# Patient Record
Sex: FEMALE | Race: WHITE | NOT HISPANIC OR LATINO | Employment: OTHER | URBAN - METROPOLITAN AREA
[De-identification: names, ages, dates, MRNs, and addresses within clinical notes are randomized per-mention and may not be internally consistent; named-entity substitution may affect disease eponyms.]

---

## 2017-01-06 ENCOUNTER — CONVERSION ENCOUNTER (OUTPATIENT)
Dept: FAMILY MEDICINE CLINIC | Facility: CLINIC | Age: 72
End: 2017-01-06

## 2017-04-10 ENCOUNTER — CONVERSION ENCOUNTER (OUTPATIENT)
Dept: FAMILY MEDICINE CLINIC | Facility: CLINIC | Age: 72
End: 2017-04-10

## 2017-04-11 ENCOUNTER — ON CAMPUS - OUTPATIENT (AMBULATORY)
Dept: URBAN - METROPOLITAN AREA HOSPITAL 2 | Facility: HOSPITAL | Age: 72
End: 2017-04-11
Payer: MEDICARE

## 2017-04-11 VITALS
SYSTOLIC BLOOD PRESSURE: 125 MMHG | HEART RATE: 83 BPM | HEART RATE: 78 BPM | SYSTOLIC BLOOD PRESSURE: 148 MMHG | DIASTOLIC BLOOD PRESSURE: 97 MMHG | DIASTOLIC BLOOD PRESSURE: 55 MMHG | SYSTOLIC BLOOD PRESSURE: 131 MMHG | OXYGEN SATURATION: 96 % | SYSTOLIC BLOOD PRESSURE: 110 MMHG | HEIGHT: 63 IN | DIASTOLIC BLOOD PRESSURE: 61 MMHG | DIASTOLIC BLOOD PRESSURE: 63 MMHG | HEART RATE: 82 BPM | OXYGEN SATURATION: 95 % | DIASTOLIC BLOOD PRESSURE: 71 MMHG | HEART RATE: 74 BPM | SYSTOLIC BLOOD PRESSURE: 127 MMHG | SYSTOLIC BLOOD PRESSURE: 117 MMHG | SYSTOLIC BLOOD PRESSURE: 137 MMHG | OXYGEN SATURATION: 98 % | RESPIRATION RATE: 18 BRPM | OXYGEN SATURATION: 97 % | TEMPERATURE: 97.4 F | WEIGHT: 153 LBS | RESPIRATION RATE: 16 BRPM | DIASTOLIC BLOOD PRESSURE: 50 MMHG | DIASTOLIC BLOOD PRESSURE: 67 MMHG | HEART RATE: 88 BPM | HEART RATE: 77 BPM | SYSTOLIC BLOOD PRESSURE: 123 MMHG | HEART RATE: 70 BPM | OXYGEN SATURATION: 99 % | SYSTOLIC BLOOD PRESSURE: 118 MMHG

## 2017-04-11 DIAGNOSIS — Z80.0 FAMILY HISTORY OF MALIGNANT NEOPLASM OF DIGESTIVE ORGANS: ICD-10-CM

## 2017-04-11 PROBLEM — Z12.11 SCREENING FOR MALIGNANT NEOPLASMS OF COLON: Status: ACTIVE | Noted: 2017-04-11

## 2017-04-11 PROCEDURE — G0105 COLORECTAL SCRN; HI RISK IND: HCPCS | Performed by: INTERNAL MEDICINE

## 2017-04-11 RX ADMIN — PROPOFOL: 10 INJECTION, EMULSION INTRAVENOUS at 15:59

## 2017-04-11 RX ADMIN — Medication 30 CC: at 13:58

## 2017-04-17 ENCOUNTER — HOSPITAL ENCOUNTER (OUTPATIENT)
Dept: MAMMOGRAPHY | Facility: HOSPITAL | Age: 72
Discharge: HOME | End: 2017-04-17
Attending: FAMILY MEDICINE | Admitting: FAMILY MEDICINE

## 2017-04-18 ENCOUNTER — OFFICE (AMBULATORY)
Dept: URBAN - METROPOLITAN AREA CLINIC 64 | Facility: CLINIC | Age: 72
End: 2017-04-18
Payer: COMMERCIAL

## 2017-04-18 VITALS
HEART RATE: 83 BPM | WEIGHT: 154 LBS | HEIGHT: 63 IN | SYSTOLIC BLOOD PRESSURE: 130 MMHG | DIASTOLIC BLOOD PRESSURE: 73 MMHG

## 2017-04-18 DIAGNOSIS — R15.9 FULL INCONTINENCE OF FECES: ICD-10-CM

## 2017-04-18 DIAGNOSIS — R32 UNSPECIFIED URINARY INCONTINENCE: ICD-10-CM

## 2017-04-18 PROCEDURE — 95970 ALYS NPGT W/O PRGRMG: CPT | Performed by: NURSE PRACTITIONER

## 2017-04-18 PROCEDURE — 99213 OFFICE O/P EST LOW 20 MIN: CPT | Mod: 25 | Performed by: NURSE PRACTITIONER

## 2017-05-02 ENCOUNTER — OFFICE (AMBULATORY)
Dept: URBAN - METROPOLITAN AREA CLINIC 64 | Facility: CLINIC | Age: 72
End: 2017-05-02

## 2017-05-02 VITALS
DIASTOLIC BLOOD PRESSURE: 75 MMHG | HEART RATE: 86 BPM | HEIGHT: 63 IN | SYSTOLIC BLOOD PRESSURE: 134 MMHG | WEIGHT: 155 LBS

## 2017-05-02 DIAGNOSIS — R15.9 FULL INCONTINENCE OF FECES: ICD-10-CM

## 2017-05-02 PROCEDURE — 99213 OFFICE O/P EST LOW 20 MIN: CPT | Mod: 25 | Performed by: NURSE PRACTITIONER

## 2017-05-02 PROCEDURE — 95971 ALYS SMPL SP/PN NPGT W/PRGRM: CPT | Performed by: NURSE PRACTITIONER

## 2017-06-01 ENCOUNTER — OFFICE (AMBULATORY)
Dept: URBAN - METROPOLITAN AREA CLINIC 64 | Facility: CLINIC | Age: 72
End: 2017-06-01

## 2017-06-01 VITALS
WEIGHT: 153 LBS | HEART RATE: 91 BPM | HEIGHT: 63 IN | SYSTOLIC BLOOD PRESSURE: 114 MMHG | DIASTOLIC BLOOD PRESSURE: 64 MMHG

## 2017-06-01 DIAGNOSIS — R15.9 FULL INCONTINENCE OF FECES: ICD-10-CM

## 2017-06-01 DIAGNOSIS — R32 UNSPECIFIED URINARY INCONTINENCE: ICD-10-CM

## 2017-06-01 PROCEDURE — 95971 ALYS SMPL SP/PN NPGT W/PRGRM: CPT | Performed by: NURSE PRACTITIONER

## 2017-07-14 ENCOUNTER — OFFICE (AMBULATORY)
Dept: URBAN - METROPOLITAN AREA CLINIC 64 | Facility: CLINIC | Age: 72
End: 2017-07-14

## 2017-07-14 VITALS
SYSTOLIC BLOOD PRESSURE: 118 MMHG | HEIGHT: 63 IN | HEART RATE: 88 BPM | DIASTOLIC BLOOD PRESSURE: 68 MMHG | WEIGHT: 153 LBS

## 2017-07-14 DIAGNOSIS — R15.9 FULL INCONTINENCE OF FECES: ICD-10-CM

## 2017-07-14 PROCEDURE — 99212 OFFICE O/P EST SF 10 MIN: CPT | Performed by: NURSE PRACTITIONER

## 2017-08-28 ENCOUNTER — CONVERSION ENCOUNTER (OUTPATIENT)
Dept: FAMILY MEDICINE CLINIC | Facility: CLINIC | Age: 72
End: 2017-08-28

## 2017-08-28 ENCOUNTER — HOSPITAL ENCOUNTER (OUTPATIENT)
Dept: FAMILY MEDICINE CLINIC | Facility: CLINIC | Age: 72
Setting detail: SPECIMEN
Discharge: HOME | End: 2017-08-28
Attending: FAMILY MEDICINE | Admitting: FAMILY MEDICINE

## 2017-08-28 LAB
BASOPHILS # BLD AUTO: 0 10*3/UL (ref 0–0.2)
BASOPHILS NFR BLD AUTO: 1 % (ref 0–2)
BILIRUB UR QL STRIP: NEGATIVE MG/DL
CASTS URNS QL MICRO: ABNORMAL /[LPF]
COLOR UR: YELLOW
CONV BACTERIA IN URINE MICRO: NEGATIVE
CONV CLARITY OF URINE: CLEAR
CONV HYALINE CASTS IN URINE MICRO: 0 /[LPF] (ref 0–5)
CONV PROTEIN IN URINE BY AUTOMATED TEST STRIP: NEGATIVE MG/DL
CONV SMALL ROUND CELLS: ABNORMAL /[HPF]
CONV UROBILINOGEN IN URINE BY AUTOMATED TEST STRIP: 1 MG/DL
CULTURE INDICATED?: ABNORMAL
DIFFERENTIAL METHOD BLD: (no result)
EOSINOPHIL # BLD AUTO: 0.1 10*3/UL (ref 0–0.3)
EOSINOPHIL # BLD AUTO: 2 % (ref 0–3)
ERYTHROCYTE [DISTWIDTH] IN BLOOD BY AUTOMATED COUNT: 12.8 % (ref 11.5–14.5)
GLUCOSE UR QL: NEGATIVE MG/DL
HCT VFR BLD AUTO: 40.3 % (ref 35–49)
HGB BLD-MCNC: 13.6 G/DL (ref 12–15)
HGB UR QL STRIP: NEGATIVE
KETONES UR QL STRIP: NEGATIVE MG/DL
LEUKOCYTE ESTERASE UR QL STRIP: ABNORMAL
LYMPHOCYTES # BLD AUTO: 1.2 10*3/UL (ref 0.8–4.8)
LYMPHOCYTES NFR BLD AUTO: 21 % (ref 18–42)
MCH RBC QN AUTO: 31.7 PG (ref 26–32)
MCHC RBC AUTO-ENTMCNC: 33.8 G/DL (ref 32–36)
MCV RBC AUTO: 93.8 FL (ref 80–94)
MONOCYTES # BLD AUTO: 0.4 10*3/UL (ref 0.1–1.3)
MONOCYTES NFR BLD AUTO: 7 % (ref 2–11)
NEUTROPHILS # BLD AUTO: 4 10*3/UL (ref 2.3–8.6)
NEUTROPHILS NFR BLD AUTO: 69 % (ref 50–75)
NITRITE UR QL STRIP: NEGATIVE
NRBC BLD AUTO-RTO: 0 /100{WBCS}
NRBC/RBC NFR BLD MANUAL: 0 10*3/UL
PH UR STRIP.AUTO: 6.5 [PH] (ref 4.5–8)
PLATELET # BLD AUTO: 198 10*3/UL (ref 150–450)
PMV BLD AUTO: 8.8 FL (ref 7.4–10.4)
RBC # BLD AUTO: 4.29 10*6/UL (ref 4–5.4)
RBC #/AREA URNS HPF: 3 /[HPF] (ref 0–3)
SP GR UR: 1.02 (ref 1–1.03)
SPERM URNS QL MICRO: ABNORMAL /[HPF]
SQUAMOUS SPT QL MICRO: 3 /[HPF] (ref 0–5)
UNIDENT CRYS URNS QL MICRO: ABNORMAL /[HPF]
WBC # BLD AUTO: 5.7 10*3/UL (ref 4.5–11.5)
WBC #/AREA URNS HPF: 9 /[HPF] (ref 0–5)
YEAST SPEC QL WET PREP: ABNORMAL /[HPF]

## 2017-08-29 LAB
AMPICILLIN SUSC ISLT: ABNORMAL
BACTERIA ISLT: ABNORMAL
BACTERIA SPEC AEROBE CULT: ABNORMAL
COLONY COUNT: ABNORMAL
LEVOFLOXACIN SUSC ISLT: ABNORMAL
Lab: ABNORMAL
MICRO REPORT STATUS: ABNORMAL
NITROFURANTOIN SUSC ISLT: ABNORMAL
SPECIMEN SOURCE: ABNORMAL
SUSC METH SPEC: ABNORMAL
TETRACYCLINE SUSC ISLT: ABNORMAL
VANCOMYCIN SUSC ISLT: ABNORMAL

## 2017-09-11 ENCOUNTER — HOSPITAL ENCOUNTER (OUTPATIENT)
Dept: CT IMAGING | Facility: HOSPITAL | Age: 72
Discharge: HOME | End: 2017-09-11
Attending: FAMILY MEDICINE | Admitting: FAMILY MEDICINE

## 2018-04-27 ENCOUNTER — HOSPITAL ENCOUNTER (OUTPATIENT)
Dept: FAMILY MEDICINE CLINIC | Facility: CLINIC | Age: 73
Setting detail: SPECIMEN
Discharge: HOME | End: 2018-04-27
Attending: FAMILY MEDICINE | Admitting: FAMILY MEDICINE

## 2018-04-27 LAB
ALBUMIN SERPL-MCNC: 3.9 G/DL (ref 3.5–4.8)
ALBUMIN/GLOB SERPL: 1.2 {RATIO} (ref 1–1.7)
ALP SERPL-CCNC: 70 IU/L (ref 32–91)
ALT SERPL-CCNC: 15 IU/L (ref 14–54)
ANION GAP SERPL CALC-SCNC: 12.1 MMOL/L (ref 10–20)
AST SERPL-CCNC: 22 IU/L (ref 15–41)
BASOPHILS # BLD AUTO: 0 10*3/UL (ref 0–0.2)
BASOPHILS NFR BLD AUTO: 1 % (ref 0–2)
BILIRUB SERPL-MCNC: 0.7 MG/DL (ref 0.3–1.2)
BILIRUB UR QL STRIP: NEGATIVE MG/DL
BUN SERPL-MCNC: 18 MG/DL (ref 8–20)
BUN/CREAT SERPL: 20 (ref 5.4–26.2)
CALCIUM SERPL-MCNC: 9.5 MG/DL (ref 8.9–10.3)
CASTS URNS QL MICRO: ABNORMAL /[LPF]
CHLORIDE SERPL-SCNC: 104 MMOL/L (ref 101–111)
CHOLEST SERPL-MCNC: 218 MG/DL
CHOLEST/HDLC SERPL: 2.3 {RATIO}
COLOR UR: YELLOW
CONV BACTERIA IN URINE MICRO: NEGATIVE
CONV CLARITY OF URINE: CLEAR
CONV CO2: 27 MMOL/L (ref 22–32)
CONV HYALINE CASTS IN URINE MICRO: 2 /[LPF] (ref 0–5)
CONV LDL CHOLESTEROL DIRECT: 96 MG/DL (ref 0–100)
CONV PROTEIN IN URINE BY AUTOMATED TEST STRIP: NEGATIVE MG/DL
CONV SMALL ROUND CELLS: ABNORMAL /[HPF]
CONV TOTAL PROTEIN: 7.1 G/DL (ref 6.1–7.9)
CONV UROBILINOGEN IN URINE BY AUTOMATED TEST STRIP: 1 MG/DL
CREAT UR-MCNC: 0.9 MG/DL (ref 0.4–1)
CULTURE INDICATED?: ABNORMAL
DIFFERENTIAL METHOD BLD: (no result)
EOSINOPHIL # BLD AUTO: 0.1 10*3/UL (ref 0–0.3)
EOSINOPHIL # BLD AUTO: 2 % (ref 0–3)
ERYTHROCYTE [DISTWIDTH] IN BLOOD BY AUTOMATED COUNT: 12.9 % (ref 11.5–14.5)
GLOBULIN UR ELPH-MCNC: 3.2 G/DL (ref 2.5–3.8)
GLUCOSE SERPL-MCNC: 90 MG/DL (ref 65–99)
GLUCOSE UR QL: NEGATIVE MG/DL
HCT VFR BLD AUTO: 41.9 % (ref 35–49)
HDLC SERPL-MCNC: 95 MG/DL
HGB BLD-MCNC: 14.1 G/DL (ref 12–15)
HGB UR QL STRIP: NEGATIVE
KETONES UR QL STRIP: NEGATIVE MG/DL
LDLC/HDLC SERPL: 1 {RATIO}
LEUKOCYTE ESTERASE UR QL STRIP: ABNORMAL
LIPID INTERPRETATION: ABNORMAL
LYMPHOCYTES # BLD AUTO: 1.5 10*3/UL (ref 0.8–4.8)
LYMPHOCYTES NFR BLD AUTO: 29 % (ref 18–42)
MCH RBC QN AUTO: 31 PG (ref 26–32)
MCHC RBC AUTO-ENTMCNC: 33.7 G/DL (ref 32–36)
MCV RBC AUTO: 92.1 FL (ref 80–94)
MONOCYTES # BLD AUTO: 0.3 10*3/UL (ref 0.1–1.3)
MONOCYTES NFR BLD AUTO: 7 % (ref 2–11)
NEUTROPHILS # BLD AUTO: 3.3 10*3/UL (ref 2.3–8.6)
NEUTROPHILS NFR BLD AUTO: 61 % (ref 50–75)
NITRITE UR QL STRIP: NEGATIVE
NRBC BLD AUTO-RTO: 0 /100{WBCS}
NRBC/RBC NFR BLD MANUAL: 0 10*3/UL
PH UR STRIP.AUTO: 7.5 [PH] (ref 4.5–8)
PLATELET # BLD AUTO: 194 10*3/UL (ref 150–450)
PMV BLD AUTO: 8.6 FL (ref 7.4–10.4)
POTASSIUM SERPL-SCNC: 4.1 MMOL/L (ref 3.6–5.1)
RBC # BLD AUTO: 4.54 10*6/UL (ref 4–5.4)
RBC #/AREA URNS HPF: 4 /[HPF] (ref 0–3)
SODIUM SERPL-SCNC: 139 MMOL/L (ref 136–144)
SP GR UR: 1.02 (ref 1–1.03)
SPERM URNS QL MICRO: ABNORMAL /[HPF]
SQUAMOUS SPT QL MICRO: 1 /[HPF] (ref 0–5)
TRIGL SERPL-MCNC: 121 MG/DL
UNIDENT CRYS URNS QL MICRO: ABNORMAL /[HPF]
VLDLC SERPL CALC-MCNC: 26.9 MG/DL
WBC # BLD AUTO: 5.2 10*3/UL (ref 4.5–11.5)
WBC #/AREA URNS HPF: 2 /[HPF] (ref 0–5)
YEAST SPEC QL WET PREP: ABNORMAL /[HPF]

## 2018-05-15 ENCOUNTER — CONVERSION ENCOUNTER (OUTPATIENT)
Dept: FAMILY MEDICINE CLINIC | Facility: CLINIC | Age: 73
End: 2018-05-15

## 2018-05-21 ENCOUNTER — HOSPITAL ENCOUNTER (OUTPATIENT)
Dept: MAMMOGRAPHY | Facility: HOSPITAL | Age: 73
Discharge: HOME | End: 2018-05-21
Attending: FAMILY MEDICINE | Admitting: FAMILY MEDICINE

## 2018-05-22 ENCOUNTER — CONVERSION ENCOUNTER (OUTPATIENT)
Dept: FAMILY MEDICINE CLINIC | Facility: CLINIC | Age: 73
End: 2018-05-22

## 2019-06-04 VITALS
RESPIRATION RATE: 12 BRPM | BODY MASS INDEX: 26.93 KG/M2 | WEIGHT: 148 LBS | SYSTOLIC BLOOD PRESSURE: 122 MMHG | HEIGHT: 63 IN | RESPIRATION RATE: 16 BRPM | BODY MASS INDEX: 26.22 KG/M2 | HEART RATE: 91 BPM | WEIGHT: 150 LBS | SYSTOLIC BLOOD PRESSURE: 138 MMHG | RESPIRATION RATE: 20 BRPM | DIASTOLIC BLOOD PRESSURE: 70 MMHG | SYSTOLIC BLOOD PRESSURE: 162 MMHG | HEIGHT: 63 IN | RESPIRATION RATE: 16 BRPM | DIASTOLIC BLOOD PRESSURE: 70 MMHG | HEART RATE: 96 BPM | WEIGHT: 152 LBS | OXYGEN SATURATION: 97 % | SYSTOLIC BLOOD PRESSURE: 144 MMHG | HEART RATE: 82 BPM | HEIGHT: 63 IN | BODY MASS INDEX: 26.58 KG/M2 | HEART RATE: 94 BPM | DIASTOLIC BLOOD PRESSURE: 72 MMHG | BODY MASS INDEX: 27.29 KG/M2 | DIASTOLIC BLOOD PRESSURE: 75 MMHG | OXYGEN SATURATION: 99 % | WEIGHT: 154 LBS | SYSTOLIC BLOOD PRESSURE: 124 MMHG | RESPIRATION RATE: 16 BRPM | WEIGHT: 165 LBS | DIASTOLIC BLOOD PRESSURE: 78 MMHG | HEART RATE: 83 BPM | OXYGEN SATURATION: 97 %

## 2019-08-12 ENCOUNTER — TELEPHONE (OUTPATIENT)
Dept: FAMILY MEDICINE CLINIC | Facility: CLINIC | Age: 74
End: 2019-08-12

## 2019-08-12 NOTE — TELEPHONE ENCOUNTER
Patient called and scheduled physical on 9/17 and fasting labs on 9/10. Please enter lab order. Thanks!

## 2019-08-13 DIAGNOSIS — I10 BENIGN ESSENTIAL HYPERTENSION: ICD-10-CM

## 2019-08-13 DIAGNOSIS — E78.2 MIXED HYPERLIPIDEMIA: Primary | ICD-10-CM

## 2019-08-13 DIAGNOSIS — E53.8 B12 DEFICIENCY: ICD-10-CM

## 2019-09-10 ENCOUNTER — LAB (OUTPATIENT)
Dept: FAMILY MEDICINE CLINIC | Facility: CLINIC | Age: 74
End: 2019-09-10

## 2019-09-10 DIAGNOSIS — E78.2 MIXED HYPERLIPIDEMIA: ICD-10-CM

## 2019-09-10 DIAGNOSIS — I10 BENIGN ESSENTIAL HYPERTENSION: ICD-10-CM

## 2019-09-10 DIAGNOSIS — E53.8 B12 DEFICIENCY: ICD-10-CM

## 2019-09-10 LAB
ALBUMIN SERPL-MCNC: 3.9 G/DL (ref 3.5–4.8)
ALBUMIN/GLOB SERPL: 1.4 G/DL (ref 1–1.7)
ALP SERPL-CCNC: 67 U/L (ref 32–91)
ALT SERPL W P-5'-P-CCNC: 17 U/L (ref 14–54)
ANION GAP SERPL CALCULATED.3IONS-SCNC: 13.9 MMOL/L (ref 5–15)
ARTICHOKE IGE QN: 116 MG/DL (ref 0–100)
AST SERPL-CCNC: 20 U/L (ref 15–41)
BASOPHILS # BLD AUTO: 0 10*3/MM3 (ref 0–0.2)
BASOPHILS NFR BLD AUTO: 0.7 % (ref 0–1.5)
BILIRUB SERPL-MCNC: 0.9 MG/DL (ref 0.3–1.2)
BILIRUB UR QL STRIP: NEGATIVE
BUN BLD-MCNC: 21 MG/DL (ref 8–20)
BUN/CREAT SERPL: 19.1 (ref 5.4–26.2)
CALCIUM SPEC-SCNC: 9.6 MG/DL (ref 8.9–10.3)
CHLORIDE SERPL-SCNC: 105 MMOL/L (ref 101–111)
CHOLEST SERPL-MCNC: 238 MG/DL
CLARITY UR: CLEAR
CO2 SERPL-SCNC: 24 MMOL/L (ref 22–32)
COLOR UR: YELLOW
CREAT BLD-MCNC: 1.1 MG/DL (ref 0.4–1)
DEPRECATED RDW RBC AUTO: 42 FL (ref 37–54)
EOSINOPHIL # BLD AUTO: 0.1 10*3/MM3 (ref 0–0.4)
EOSINOPHIL NFR BLD AUTO: 1.7 % (ref 0.3–6.2)
ERYTHROCYTE [DISTWIDTH] IN BLOOD BY AUTOMATED COUNT: 13.2 % (ref 12.3–15.4)
GFR SERPL CREATININE-BSD FRML MDRD: 49 ML/MIN/1.73
GLOBULIN UR ELPH-MCNC: 2.8 GM/DL (ref 2.5–3.8)
GLUCOSE BLD-MCNC: 85 MG/DL (ref 65–99)
GLUCOSE UR STRIP-MCNC: NEGATIVE MG/DL
HCT VFR BLD AUTO: 38.7 % (ref 34–46.6)
HDLC SERPL QL: 2.33
HDLC SERPL-MCNC: 102 MG/DL
HGB BLD-MCNC: 13.4 G/DL (ref 12–15.9)
HGB UR QL STRIP.AUTO: NEGATIVE
KETONES UR QL STRIP: NEGATIVE
LDLC/HDLC SERPL: 1.15 {RATIO}
LEUKOCYTE ESTERASE UR QL STRIP.AUTO: NEGATIVE
LYMPHOCYTES # BLD AUTO: 1.4 10*3/MM3 (ref 0.7–3.1)
LYMPHOCYTES NFR BLD AUTO: 28.2 % (ref 19.6–45.3)
MCH RBC QN AUTO: 32 PG (ref 26.6–33)
MCHC RBC AUTO-ENTMCNC: 34.7 G/DL (ref 31.5–35.7)
MCV RBC AUTO: 92.1 FL (ref 79–97)
MONOCYTES # BLD AUTO: 0.3 10*3/MM3 (ref 0.1–0.9)
MONOCYTES NFR BLD AUTO: 6.4 % (ref 5–12)
NEUTROPHILS # BLD AUTO: 3.2 10*3/MM3 (ref 1.7–7)
NEUTROPHILS NFR BLD AUTO: 63 % (ref 42.7–76)
NITRITE UR QL STRIP: NEGATIVE
NRBC BLD AUTO-RTO: 0.2 /100 WBC (ref 0–0.2)
PH UR STRIP.AUTO: 7 [PH] (ref 5–8)
PLATELET # BLD AUTO: 215 10*3/MM3 (ref 140–450)
PMV BLD AUTO: 8.3 FL (ref 6–12)
POTASSIUM BLD-SCNC: 3.9 MMOL/L (ref 3.6–5.1)
PROT SERPL-MCNC: 6.7 G/DL (ref 6.1–7.9)
PROT UR QL STRIP: NEGATIVE
RBC # BLD AUTO: 4.2 10*6/MM3 (ref 3.77–5.28)
SODIUM BLD-SCNC: 139 MMOL/L (ref 136–144)
SP GR UR STRIP: 1.02 (ref 1–1.03)
TRIGL SERPL-MCNC: 96 MG/DL
TSH SERPL DL<=0.05 MIU/L-ACNC: 1.95 UIU/ML (ref 0.34–5.6)
UROBILINOGEN UR QL STRIP: NORMAL
VIT B12 BLD-MCNC: 247 PG/ML (ref 180–914)
VLDLC SERPL-MCNC: 19.2 MG/DL
WBC NRBC COR # BLD: 5 10*3/MM3 (ref 3.4–10.8)

## 2019-09-10 PROCEDURE — 81003 URINALYSIS AUTO W/O SCOPE: CPT | Performed by: FAMILY MEDICINE

## 2019-09-10 PROCEDURE — 82607 VITAMIN B-12: CPT | Performed by: FAMILY MEDICINE

## 2019-09-10 PROCEDURE — 80061 LIPID PANEL: CPT | Performed by: FAMILY MEDICINE

## 2019-09-10 PROCEDURE — 85025 COMPLETE CBC W/AUTO DIFF WBC: CPT | Performed by: FAMILY MEDICINE

## 2019-09-10 PROCEDURE — 84443 ASSAY THYROID STIM HORMONE: CPT | Performed by: FAMILY MEDICINE

## 2019-09-10 PROCEDURE — 80053 COMPREHEN METABOLIC PANEL: CPT | Performed by: FAMILY MEDICINE

## 2019-09-17 ENCOUNTER — OFFICE VISIT (OUTPATIENT)
Dept: FAMILY MEDICINE CLINIC | Facility: CLINIC | Age: 74
End: 2019-09-17

## 2019-09-17 VITALS
BODY MASS INDEX: 25.33 KG/M2 | RESPIRATION RATE: 12 BRPM | SYSTOLIC BLOOD PRESSURE: 100 MMHG | WEIGHT: 143 LBS | DIASTOLIC BLOOD PRESSURE: 61 MMHG | HEART RATE: 94 BPM

## 2019-09-17 DIAGNOSIS — M85.852 OSTEOPENIA OF NECKS OF BOTH FEMURS: ICD-10-CM

## 2019-09-17 DIAGNOSIS — Z00.00 MEDICARE ANNUAL WELLNESS VISIT, SUBSEQUENT: Primary | ICD-10-CM

## 2019-09-17 DIAGNOSIS — R00.2 PALPITATIONS: ICD-10-CM

## 2019-09-17 DIAGNOSIS — R00.2 HEART PALPITATIONS: ICD-10-CM

## 2019-09-17 DIAGNOSIS — M85.851 OSTEOPENIA OF NECKS OF BOTH FEMURS: ICD-10-CM

## 2019-09-17 DIAGNOSIS — Z12.39 BREAST CANCER SCREENING: ICD-10-CM

## 2019-09-17 PROBLEM — M13.0 POLYARTHROPATHY OR POLYARTHRITIS: Status: ACTIVE | Noted: 2019-09-17

## 2019-09-17 PROBLEM — K21.9 ESOPHAGEAL REFLUX: Status: ACTIVE | Noted: 2019-09-17

## 2019-09-17 PROBLEM — E53.8 B12 DEFICIENCY: Status: ACTIVE | Noted: 2017-04-10

## 2019-09-17 PROCEDURE — G0439 PPPS, SUBSEQ VISIT: HCPCS | Performed by: FAMILY MEDICINE

## 2019-09-17 RX ORDER — TRAZODONE HYDROCHLORIDE 50 MG/1
50 TABLET ORAL NIGHTLY
Qty: 90 TABLET | Refills: 3 | Status: SHIPPED | OUTPATIENT
Start: 2019-09-17 | End: 2020-08-31

## 2019-09-17 RX ORDER — ESCITALOPRAM OXALATE 10 MG/1
TABLET ORAL EVERY 24 HOURS
COMMUNITY
Start: 2018-12-07 | End: 2020-02-11

## 2019-09-17 RX ORDER — MELOXICAM 15 MG/1
TABLET ORAL
COMMUNITY
Start: 2018-05-15 | End: 2020-02-11

## 2019-09-17 RX ORDER — OMEPRAZOLE 40 MG/1
1 CAPSULE, DELAYED RELEASE ORAL EVERY 24 HOURS
COMMUNITY
Start: 2018-11-08 | End: 2020-02-11

## 2019-09-17 RX ORDER — HYDROCHLOROTHIAZIDE 12.5 MG/1
1 CAPSULE, GELATIN COATED ORAL EVERY 24 HOURS
COMMUNITY
Start: 2017-10-16 | End: 2020-02-11

## 2019-09-17 RX ORDER — DULOXETIN HYDROCHLORIDE 30 MG/1
CAPSULE, DELAYED RELEASE ORAL DAILY
COMMUNITY
Start: 2011-09-27 | End: 2020-02-11

## 2019-09-17 RX ORDER — TIZANIDINE 4 MG/1
4 TABLET ORAL NIGHTLY
Qty: 90 TABLET | Refills: 3 | Status: SHIPPED | OUTPATIENT
Start: 2019-09-17 | End: 2020-08-31

## 2019-09-17 RX ORDER — TRAZODONE HYDROCHLORIDE 50 MG/1
TABLET ORAL EVERY 24 HOURS
COMMUNITY
Start: 2018-05-11 | End: 2019-09-17 | Stop reason: SDUPTHER

## 2019-09-17 RX ORDER — TIZANIDINE 4 MG/1
TABLET ORAL EVERY 24 HOURS
COMMUNITY
Start: 2012-10-18 | End: 2019-09-17

## 2019-09-17 RX ORDER — ARIPIPRAZOLE 2 MG/1
TABLET ORAL EVERY 24 HOURS
COMMUNITY
Start: 2018-03-01 | End: 2019-09-17 | Stop reason: SDUPTHER

## 2019-09-17 RX ORDER — ARIPIPRAZOLE 2 MG/1
2 TABLET ORAL DAILY
Qty: 90 TABLET | Refills: 3 | Status: SHIPPED | OUTPATIENT
Start: 2019-09-17 | End: 2020-08-31

## 2019-09-17 NOTE — PATIENT INSTRUCTIONS
Exercising to Stay Healthy  To become healthy and stay healthy, it is recommended that you do moderate-intensity and vigorous-intensity exercise. You can tell that you are exercising at a moderate intensity if your heart starts beating faster and you start breathing faster but can still hold a conversation. You can tell that you are exercising at a vigorous intensity if you are breathing much harder and faster and cannot hold a conversation while exercising.  Exercising regularly is important. It has many health benefits, such as:  · Improving overall fitness, flexibility, and endurance.  · Increasing bone density.  · Helping with weight control.  · Decreasing body fat.  · Increasing muscle strength.  · Reducing stress and tension.  · Improving overall health.  How often should I exercise?  Choose an activity that you enjoy, and set realistic goals. Your health care provider can help you make an activity plan that works for you.  Exercise regularly as told by your health care provider. This may include:  · Doing strength training two times a week, such as:  ? Lifting weights.  ? Using resistance bands.  ? Push-ups.  ? Sit-ups.  ? Yoga.  · Doing a certain intensity of exercise for a given amount of time. Choose from these options:  ? A total of 150 minutes of moderate-intensity exercise every week.  ? A total of 75 minutes of vigorous-intensity exercise every week.  ? A mix of moderate-intensity and vigorous-intensity exercise every week.  Children, pregnant women, people who have not exercised regularly, people who are overweight, and older adults may need to talk with a health care provider about what activities are safe to do. If you have a medical condition, be sure to talk with your health care provider before you start a new exercise program.  What are some exercise ideas?  Moderate-intensity exercise ideas include:  · Walking 1 mile (1.6 km) in about 15  minutes.  · Biking.  · Hiking.  · Golfing.  · Dancing.  · Water aerobics.  Vigorous-intensity exercise ideas include:  · Walking 4.5 miles (7.2 km) or more in about 1 hour.  · Jogging or running 5 miles (8 km) in about 1 hour.  · Biking 10 miles (16.1 km) or more in about 1 hour.  · Lap swimming.  · Roller-skating or in-line skating.  · Cross-country skiing.  · Vigorous competitive sports, such as football, basketball, and soccer.  · Jumping rope.  · Aerobic dancing.  What are some everyday activities that can help me to get exercise?  · Yard work, such as:  ? Pushing a .  ? Raking and bagging leaves.  · Washing your car.  · Pushing a stroller.  · Shoveling snow.  · Gardening.  · Washing windows or floors.  How can I be more active in my day-to-day activities?  · Use stairs instead of an elevator.  · Take a walk during your lunch break.  · If you drive, park your car farther away from your work or school.  · If you take public transportation, get off one stop early and walk the rest of the way.  · Stand up or walk around during all of your indoor phone calls.  · Get up, stretch, and walk around every 30 minutes throughout the day.  · Enjoy exercise with a friend. Support to continue exercising will help you keep a regular routine of activity.  What guidelines can I follow while exercising?  · Before you start a new exercise program, talk with your health care provider.  · Do not exercise so much that you hurt yourself, feel dizzy, or get very short of breath.  · Wear comfortable clothes and wear shoes with good support.  · Drink plenty of water while you exercise to prevent dehydration or heat stroke.  · Work out until your breathing and your heartbeat get faster.  Where to find more information  · [place] Department of Health and Human Services: www.hhs.gov  · Centers for Disease Control and Prevention (CDC): www.cdc.gov  Summary  · Exercising regularly is important. It will improve your overall fitness,  flexibility, and endurance.  · Regular exercise also will improve your overall health. It can help you control your weight, reduce stress, and improve your bone density.  · Do not exercise so much that you hurt yourself, feel dizzy, or get very short of breath.  · Before you start a new exercise program, talk with your health care provider.  This information is not intended to replace advice given to you by your health care provider. Make sure you discuss any questions you have with your health care provider.  Document Released: 01/20/2012 Document Revised: 11/08/2018 Document Reviewed: 11/08/2018  [employer] Interactive Patient Education © 2019 [employer]    Living With Anxiety    After being diagnosed with an anxiety disorder, you may be relieved to know why you have felt or behaved a certain way. It is natural to also feel overwhelmed about the treatment ahead and what it will mean for your life. With care and support, you can manage this condition and recover from it.  How to cope with anxiety  Dealing with stress  Stress is your body’s reaction to life changes and events, both good and bad. Stress can last just a few hours or it can be ongoing. Stress can play a major role in anxiety, so it is important to learn both how to cope with stress and how to think about it differently.  Talk with your health care provider or a counselor to learn more about stress reduction. He or she may suggest some stress reduction techniques, such as:  · Music therapy. This can include creating or listening to music that you enjoy and that inspires you.  · Mindfulness-based meditation. This involves being aware of your normal breaths, rather than trying to control your breathing. It can be done while sitting or walking.  · Centering prayer. This is a kind of meditation that involves focusing on a word, phrase, or sacred image that is meaningful to you and that brings you peace.  · Deep breathing. To do this, expand your stomach  and inhale slowly through your nose. Hold your breath for 3-5 seconds. Then exhale slowly, allowing your stomach muscles to relax.  · Self-talk. This is a skill where you identify thought patterns that lead to anxiety reactions and correct those thoughts.  · Muscle relaxation. This involves tensing muscles then relaxing them.  Choose a stress reduction technique that fits your lifestyle and personality. Stress reduction techniques take time and practice. Set aside 5-15 minutes a day to do them. Therapists can offer training in these techniques. The training may be covered by some insurance plans. Other things you can do to manage stress include:  · Keeping a stress diary. This can help you learn what triggers your stress and ways to control your response.  · Thinking about how you respond to certain situations. You may not be able to control everything, but you can control your reaction.  · Making time for activities that help you relax, and not feeling guilty about spending your time in this way.  Therapy combined with coping and stress-reduction skills provides the best chance for successful treatment.  Medicines  Medicines can help ease symptoms. Medicines for anxiety include:  · Anti-anxiety drugs.  · Antidepressants.  · Beta-blockers.  Medicines may be used as the main treatment for anxiety disorder, along with therapy, or if other treatments are not working. Medicines should be prescribed by a health care provider.  Relationships  Relationships can play a big part in helping you recover. Try to spend more time connecting with trusted friends and family members. Consider going to couples counseling, taking family education classes, or going to family therapy. Therapy can help you and others better understand the condition.  How to recognize changes in your condition  Everyone has a different response to treatment for anxiety. Recovery from anxiety happens when symptoms decrease and stop interfering with your  daily activities at home or work. This may mean that you will start to:  · Have better concentration and focus.  · Sleep better.  · Be less irritable.  · Have more energy.  · Have improved memory.  It is important to recognize when your condition is getting worse. Contact your health care provider if your symptoms interfere with home or work and you do not feel like your condition is improving.  Where to find help and support:  You can get help and support from these sources:  · Self-help groups.  · Online and community organizations.  · A trusted spiritual leader.  · Couples counseling.  · Family education classes.  · Family therapy.  Follow these instructions at home:  · Eat a healthy diet that includes plenty of vegetables, fruits, whole grains, low-fat dairy products, and lean protein. Do not eat a lot of foods that are high in solid fats, added sugars, or salt.  · Exercise. Most adults should do the following:  ? Exercise for at least 150 minutes each week. The exercise should increase your heart rate and make you sweat (moderate-intensity exercise).  ? Strengthening exercises at least twice a week.  · Cut down on caffeine, tobacco, alcohol, and other potentially harmful substances.  · Get the right amount and quality of sleep. Most adults need 7-9 hours of sleep each night.  · Make choices that simplify your life.  · Take over-the-counter and prescription medicines only as told by your health care provider.  · Avoid caffeine, alcohol, and certain over-the-counter cold medicines. These may make you feel worse. Ask your pharmacist which medicines to avoid.  · Keep all follow-up visits as told by your health care provider. This is important.  Questions to ask your health care provider  · Would I benefit from therapy?  · How often should I follow up with a health care provider?  · How long do I need to take medicine?  · Are there any long-term side effects of my medicine?  · Are there any alternatives to taking  medicine?  Contact a health care provider if:  · You have a hard time staying focused or finishing daily tasks.  · You spend many hours a day feeling worried about everyday life.  · You become exhausted by worry.  · You start to have headaches, feel tense, or have nausea.  · You urinate more than normal.  · You have diarrhea.  Get help right away if:  · You have a racing heart and shortness of breath.  · You have thoughts of hurting yourself or others.  If you ever feel like you may hurt yourself or others, or have thoughts about taking your own life, get help right away. You can go to your nearest emergency department or call:  · Your local emergency services (911 in the [place]  · A suicide crisis helpline, such as the National Suicide Prevention Lifeline at 1-446.346.6965. This is open 24-hours a day.  Summary  · Taking steps to deal with stress can help calm you.  · Medicines cannot cure anxiety disorders, but they can help ease symptoms.  · Family, friends, and partners can play a big part in helping you recover from an anxiety disorder.  This information is not intended to replace advice given to you by your health care provider. Make sure you discuss any questions you have with your health care provider.  Document Released: 12/12/2017 Document Revised: 12/12/2017 Document Reviewed: 12/12/2017  [employer] Interactive Patient Education © 2019 [employer]

## 2019-09-17 NOTE — PROGRESS NOTES
The ABCs of the Annual Wellness Visit  Subsequent Medicare Wellness Visit    Chief Complaint   Patient presents with   • Medicare Wellness-subsequent       Subjective   History of Present Illness:  Chel Hi is a 73 y.o. female who presents for a Subsequent Medicare Wellness Visit.  Patient does not exercise.  She denies any chest pain or syncope.  She does complain of getting dizzy and lightheaded this been going on for 3 months.  She feels like her heart will skip during the same time that she feels lightheaded.  It is not exertional.  It happens daily at least the dizziness well and will last for several seconds.  It will sometimes get worse if she goes from a sitting to a standing position.  She has had no syncopal episodes.  She denies any chest pain.  She has no associated nausea vomiting or diaphoresis.  She did have a cardiac catheterization in 2014 that was normal.  Patient has a good memory and feels like her depression and anxiety and fibromyalgia are all under relatively good control.  She transports herself and takes care of her own own ADLs.    HEALTH RISK ASSESSMENT    Recent Hospitalizations:  No hospitalization(s) within the last year.    Current Medical Providers:  Patient Care Team:  John Santana MD as PCP - General  Ulysses, NI Laguerre as PCP - Claims Attributed    Smoking Status:  Social History     Tobacco Use   Smoking Status Never Smoker   Smokeless Tobacco Never Used       Alcohol Consumption:  Social History     Substance and Sexual Activity   Alcohol Use No   • Frequency: Never       Depression Screen:   PHQ-2/PHQ-9 Depression Screening 9/17/2019   Little interest or pleasure in doing things 3   Feeling down, depressed, or hopeless 3   Trouble falling or staying asleep, or sleeping too much 3   Feeling tired or having little energy 3   Poor appetite or overeating 0   Feeling bad about yourself - or that you are a failure or have let yourself or your family  down 3   Trouble concentrating on things, such as reading the newspaper or watching television 3   Moving or speaking so slowly that other people could have noticed. Or the opposite - being so fidgety or restless that you have been moving around a lot more than usual 0   Thoughts that you would be better off dead, or of hurting yourself in some way 0   Total Score 18   If you checked off any problems, how difficult have these problems made it for you to do your work, take care of things at home, or get along with other people? Not difficult at all       Fall Risk Screen:  STEBEKAH Fall Risk Assessment was completed, and patient is at LOW risk for falls.Assessment completed on:9/17/2019    Health Habits and Functional and Cognitive Screening:  Functional & Cognitive Status 9/17/2019   Do you have difficulty preparing food and eating? No   Do you have difficulty bathing yourself, getting dressed or grooming yourself? No   Do you have difficulty using the toilet? No   Do you have difficulty moving around from place to place? No   Do you have trouble with steps or getting out of a bed or a chair? No   Current Diet Well Balanced Diet   Dental Exam Up to date   Eye Exam Up to date   Exercise (times per week) 2 times per week   Current Exercise Activities Include Walking   Do you need help using the phone?  No   Are you deaf or do you have serious difficulty hearing?  Yes   Do you need help with transportation? No   Do you need help shopping? No   Do you need help preparing meals?  No   Do you need help with housework?  No   Do you need help with laundry? No   Do you need help taking your medications? No   Do you need help managing money? No   Have you felt unusual stress, anger or loneliness in the last month? Yes   Who do you live with? Spouse   If you need help, do you have trouble finding someone available to you? No   Do you have difficulty concentrating, remembering or making decisions? Yes         Does the patient  have evidence of cognitive impairment? No    Asprin use counseling:Does not need ASA (and currently is not on it)    Age-appropriate Screening Schedule:  Refer to the list below for future screening recommendations based on patient's age, sex and/or medical conditions. Orders for these recommended tests are listed in the plan section. The patient has been provided with a written plan.    Health Maintenance   Topic Date Due   • PNEUMOCOCCAL VACCINES (65+ LOW/MEDIUM RISK) (1 of 2 - PCV13) 12/22/2011   • INFLUENZA VACCINE  08/01/2019   • DXA SCAN  09/17/2019   • TDAP/TD VACCINES (1 - Tdap) 09/17/2020 (Originally 12/22/1964)   • ZOSTER VACCINE (1 of 2) 09/17/2020 (Originally 12/22/1996)   • MAMMOGRAM  05/21/2020   • LIPID PANEL  09/10/2020   • COLONOSCOPY  04/11/2027          The following portions of the patient's history were reviewed and updated as appropriate: allergies, current medications, past family history, past medical history, past social history, past surgical history and problem list.    Outpatient Medications Prior to Visit   Medication Sig Dispense Refill   • Chlorcyclizine-Pseudoephed (STAHIST AD) 25-60 MG tablet STAHIST AD 25-60 MG TABS     • DULoxetine (CYMBALTA) 30 MG capsule Take  by mouth Daily.     • escitalopram (LEXAPRO) 10 MG tablet Daily.     • hydrochlorothiazide (MICROZIDE) 12.5 MG capsule 1 capsule Daily.     • meloxicam (MOBIC) 15 MG tablet MOBIC 15 MG TABS     • omeprazole (priLOSEC) 40 MG capsule 1 capsule Daily.     • ARIPiprazole (ABILIFY) 2 MG tablet Daily.     • tiZANidine (ZANAFLEX) 4 MG tablet Daily.     • traZODone (DESYREL) 50 MG tablet Daily.       No facility-administered medications prior to visit.        Patient Active Problem List   Diagnosis   • Absolute anemia   • Anxiety   • Arthritis   • Depression   • Benign essential hypertension   • Hyperlipidemia   • Fibromyalgia   • Restless leg syndrome   • Esophageal reflux   • Fatigue   • Insomnia   • Leg pain, bilateral   • Night  sweats   • Palpitations   • Spinal stenosis of lumbar region   • Trochanteric bursitis   • B12 deficiency   • Vitamin B deficiency   • Anemia in other chronic diseases classified elsewhere   • Polyarthropathy or polyarthritis   • Medicare annual wellness visit, subsequent       Advanced Care Planning:  Patient has an advance directive - a copy has been provided and is visible in patient header    Review of Systems   Constitutional: Positive for fatigue. Negative for chills and fever.   HENT: Negative for mouth sores.    Respiratory: Positive for shortness of breath. Negative for chest tightness.    Cardiovascular: Positive for palpitations. Negative for chest pain.   Gastrointestinal: Negative for abdominal pain and blood in stool.   Genitourinary: Negative for dysuria and hematuria.   Neurological: Positive for dizziness and light-headedness. Negative for seizures, syncope, speech difficulty and headaches.   Psychiatric/Behavioral: Negative for suicidal ideas. The patient is nervous/anxious.        Compared to one year ago, the patient feels her physical health is the same.  Compared to one year ago, the patient feels her mental health is worse.    Reviewed chart for potential of high risk medication in the elderly: yes  Reviewed chart for potential of harmful drug interactions in the elderly:yes    Objective         Vitals:    09/17/19 1550   BP: 100/61   BP Location: Right arm   Patient Position: Sitting   Cuff Size: Adult   Pulse: 94   Resp: 12   Weight: 64.9 kg (143 lb)   PainSc: 0-No pain       Body mass index is 25.33 kg/m².  Discussed the patient's BMI with her. The BMI is in the acceptable range.    Physical Exam   Constitutional: She appears well-developed and well-nourished. No distress.   Anxious but pleasant   HENT:   Head: Normocephalic and atraumatic.   Nose: Nose normal.   Mouth/Throat: Oropharynx is clear and moist.   Eyes: Conjunctivae, EOM and lids are normal. Pupils are equal, round, and reactive  to light.   Neck: Normal range of motion. Neck supple. No JVD present. Carotid bruit is not present. No thyroid mass and no thyromegaly present.   No carotid bruits   Cardiovascular: Normal rate, regular rhythm, normal heart sounds and intact distal pulses.   Pulmonary/Chest: Effort normal and breath sounds normal.   Abdominal: Soft. Bowel sounds are normal. She exhibits no distension and no mass. There is no tenderness.   Musculoskeletal: Normal range of motion. She exhibits no edema.   No clubbing cyanosis or edema   Skin: Skin is warm and dry.   Psychiatric: She has a normal mood and affect. Her speech is normal and behavior is normal. Judgment and thought content normal. She is attentive.   Nursing note and vitals reviewed.      Lab Results   Component Value Date    TRIG 96 09/10/2019     09/10/2019     (H) 09/10/2019    VLDL 19.2 09/10/2019        Assessment/Plan   Medicare Risks and Personalized Health Plan  CMS Preventative Services Quick Reference  Advance Directive Discussion  Cardiovascular risk  Colon Cancer Screening  Dementia/Memory   Depression/Dysphoria  Fall Risk  Glaucoma Risk  Hearing Problem  Immunizations Discussed/Encouraged (specific immunizations; adacel Tdap, Influenza, Pneumococcal 23, Prevnar and Shingrix )  Osteoprorosis Risk    The above risks/problems have been discussed with the patient.  Pertinent information has been shared with the patient in the After Visit Summary.  Follow up plans and orders are seen below in the Assessment/Plan Section.    Diagnoses and all orders for this visit:    1. Medicare annual wellness visit, subsequent (Primary)  Assessment & Plan:  Recommend the patient undergo regular exercise and nutritious diet.  She needs to lose a few pounds as well as discussed with her immunizations that are appropriate      2. Breast cancer screening  -     Mammo Screening Digital Tomosynthesis Bilateral With CAD; Future    3. Osteopenia of necks of both femurs  -      DEXA Bone Density Axial    4. Heart palpitations  -     Holter Monitor - 24 Hour; Future    5. Palpitations  Assessment & Plan:  24 hour holter   Avoid caffeine         Other orders  -     ARIPiprazole (ABILIFY) 2 MG tablet; Take 1 tablet by mouth Daily.  Dispense: 90 tablet; Refill: 3  -     tiZANidine (ZANAFLEX) 4 MG tablet; Take 1 tablet by mouth Every Night.  Dispense: 90 tablet; Refill: 3  -     traZODone (DESYREL) 50 MG tablet; Take 1 tablet by mouth Every Night.  Dispense: 90 tablet; Refill: 3    Follow Up:  Return if symptoms worsen or fail to improve.     An After Visit Summary and PPPS were given to the patient.

## 2019-09-18 NOTE — ASSESSMENT & PLAN NOTE
Recommend the patient undergo regular exercise and nutritious diet.  She needs to lose a few pounds as well as discussed with her immunizations that are appropriate

## 2019-09-25 ENCOUNTER — HOSPITAL ENCOUNTER (OUTPATIENT)
Dept: RESPIRATORY THERAPY | Facility: HOSPITAL | Age: 74
Discharge: HOME | End: 2019-09-25

## 2019-09-25 ENCOUNTER — HOSPITAL ENCOUNTER (OUTPATIENT)
Dept: MAMMOGRAPHY | Facility: HOSPITAL | Age: 74
Discharge: HOME | End: 2019-09-25
Admitting: FAMILY MEDICINE

## 2019-09-25 ENCOUNTER — HOSPITAL ENCOUNTER (OUTPATIENT)
Dept: BONE DENSITY | Facility: HOSPITAL | Age: 74
Discharge: HOME | End: 2019-09-25

## 2019-09-25 DIAGNOSIS — Z12.39 BREAST CANCER SCREENING: ICD-10-CM

## 2019-09-25 DIAGNOSIS — R00.2 HEART PALPITATIONS: ICD-10-CM

## 2019-09-25 PROCEDURE — 77080 DXA BONE DENSITY AXIAL: CPT

## 2019-09-25 PROCEDURE — 77067 SCR MAMMO BI INCL CAD: CPT

## 2019-09-25 PROCEDURE — 93226 XTRNL ECG REC<48 HR SCAN A/R: CPT

## 2019-09-25 PROCEDURE — 77063 BREAST TOMOSYNTHESIS BI: CPT

## 2019-09-30 PROCEDURE — 93227 XTRNL ECG REC<48 HR R&I: CPT | Performed by: INTERNAL MEDICINE

## 2019-12-13 ENCOUNTER — OFFICE (AMBULATORY)
Dept: URBAN - METROPOLITAN AREA CLINIC 64 | Facility: CLINIC | Age: 74
End: 2019-12-13
Payer: COMMERCIAL

## 2019-12-13 VITALS
HEART RATE: 85 BPM | DIASTOLIC BLOOD PRESSURE: 84 MMHG | WEIGHT: 146 LBS | SYSTOLIC BLOOD PRESSURE: 131 MMHG | HEIGHT: 63 IN

## 2019-12-13 DIAGNOSIS — K59.00 CONSTIPATION, UNSPECIFIED: ICD-10-CM

## 2019-12-13 DIAGNOSIS — Z90.49 ACQUIRED ABSENCE OF OTHER SPECIFIED PARTS OF DIGESTIVE TRACT: ICD-10-CM

## 2019-12-13 DIAGNOSIS — R19.4 CHANGE IN BOWEL HABIT: ICD-10-CM

## 2019-12-13 DIAGNOSIS — I10 ESSENTIAL (PRIMARY) HYPERTENSION: ICD-10-CM

## 2019-12-13 DIAGNOSIS — R15.9 FULL INCONTINENCE OF FECES: ICD-10-CM

## 2019-12-13 DIAGNOSIS — F41.9 ANXIETY DISORDER, UNSPECIFIED: ICD-10-CM

## 2019-12-13 PROCEDURE — 99213 OFFICE O/P EST LOW 20 MIN: CPT | Mod: 25 | Performed by: NURSE PRACTITIONER

## 2019-12-13 PROCEDURE — 95971 ALYS SMPL SP/PN NPGT W/PRGRM: CPT | Performed by: NURSE PRACTITIONER

## 2020-01-24 ENCOUNTER — OFFICE (AMBULATORY)
Dept: URBAN - METROPOLITAN AREA CLINIC 64 | Facility: CLINIC | Age: 75
End: 2020-01-24

## 2020-01-24 VITALS
HEIGHT: 63 IN | DIASTOLIC BLOOD PRESSURE: 71 MMHG | WEIGHT: 151 LBS | SYSTOLIC BLOOD PRESSURE: 122 MMHG | HEART RATE: 79 BPM

## 2020-01-24 DIAGNOSIS — Z96.89 PRESENCE OF OTHER SPECIFIED FUNCTIONAL IMPLANTS: ICD-10-CM

## 2020-01-24 DIAGNOSIS — K59.00 CONSTIPATION, UNSPECIFIED: ICD-10-CM

## 2020-01-24 DIAGNOSIS — R15.9 FULL INCONTINENCE OF FECES: ICD-10-CM

## 2020-01-24 PROCEDURE — 95971 ALYS SMPL SP/PN NPGT W/PRGRM: CPT | Performed by: NURSE PRACTITIONER

## 2020-01-24 PROCEDURE — 99213 OFFICE O/P EST LOW 20 MIN: CPT | Mod: 25 | Performed by: NURSE PRACTITIONER

## 2020-02-11 RX ORDER — MELOXICAM 15 MG/1
TABLET ORAL
Qty: 90 TABLET | Refills: 3 | Status: SHIPPED | OUTPATIENT
Start: 2020-02-11 | End: 2021-01-25 | Stop reason: SDUPTHER

## 2020-02-11 RX ORDER — HYDROCHLOROTHIAZIDE 12.5 MG/1
CAPSULE, GELATIN COATED ORAL
Qty: 90 CAPSULE | Refills: 3 | Status: SHIPPED | OUTPATIENT
Start: 2020-02-11 | End: 2021-01-25

## 2020-02-11 RX ORDER — DULOXETIN HYDROCHLORIDE 30 MG/1
CAPSULE, DELAYED RELEASE ORAL
Qty: 90 CAPSULE | Refills: 3 | Status: SHIPPED | OUTPATIENT
Start: 2020-02-11 | End: 2021-01-25 | Stop reason: SDUPTHER

## 2020-02-11 RX ORDER — ESCITALOPRAM OXALATE 10 MG/1
TABLET ORAL
Qty: 90 TABLET | Refills: 3 | Status: SHIPPED | OUTPATIENT
Start: 2020-02-11 | End: 2020-11-24

## 2020-02-11 RX ORDER — OMEPRAZOLE 40 MG/1
CAPSULE, DELAYED RELEASE ORAL
Qty: 90 CAPSULE | Refills: 3 | Status: SHIPPED | OUTPATIENT
Start: 2020-02-11 | End: 2021-01-25 | Stop reason: SDUPTHER

## 2020-03-06 ENCOUNTER — OFFICE (AMBULATORY)
Dept: URBAN - METROPOLITAN AREA CLINIC 64 | Facility: CLINIC | Age: 75
End: 2020-03-06

## 2020-03-06 VITALS
WEIGHT: 150 LBS | SYSTOLIC BLOOD PRESSURE: 130 MMHG | DIASTOLIC BLOOD PRESSURE: 90 MMHG | HEART RATE: 85 BPM | HEIGHT: 63 IN

## 2020-03-06 DIAGNOSIS — R15.9 FULL INCONTINENCE OF FECES: ICD-10-CM

## 2020-03-06 DIAGNOSIS — R19.4 CHANGE IN BOWEL HABIT: ICD-10-CM

## 2020-03-06 PROCEDURE — 99213 OFFICE O/P EST LOW 20 MIN: CPT | Performed by: NURSE PRACTITIONER

## 2020-08-31 RX ORDER — TIZANIDINE 4 MG/1
TABLET ORAL
Qty: 90 TABLET | Refills: 3 | Status: SHIPPED | OUTPATIENT
Start: 2020-08-31 | End: 2021-09-23 | Stop reason: SDUPTHER

## 2020-08-31 RX ORDER — TRAZODONE HYDROCHLORIDE 50 MG/1
TABLET ORAL
Qty: 90 TABLET | Refills: 3 | Status: SHIPPED | OUTPATIENT
Start: 2020-08-31 | End: 2021-08-18 | Stop reason: SDUPTHER

## 2020-08-31 RX ORDER — ARIPIPRAZOLE 2 MG/1
TABLET ORAL
Qty: 90 TABLET | Refills: 3 | Status: SHIPPED | OUTPATIENT
Start: 2020-08-31 | End: 2021-08-18 | Stop reason: SDUPTHER

## 2020-09-21 ENCOUNTER — TELEPHONE (OUTPATIENT)
Dept: FAMILY MEDICINE CLINIC | Facility: CLINIC | Age: 75
End: 2020-09-21

## 2020-09-21 NOTE — TELEPHONE ENCOUNTER
Spoke with patient and scheduled a [facility] appt on 11/17 for the fasting labs.  She is seeing [facility] on 11/24 for her annual Medicare Wellness Exam.  Please put lab orders in.

## 2020-09-21 NOTE — TELEPHONE ENCOUNTER
Caller: Chel Hi    Relationship to patient: Self    Best call back number: [phone]    Patient is needing: Patient calling to schedule labs to coincide with [facility] on 11/24/2020.

## 2020-09-24 DIAGNOSIS — E78.2 MIXED HYPERLIPIDEMIA: Primary | ICD-10-CM

## 2020-09-24 DIAGNOSIS — I10 BENIGN ESSENTIAL HYPERTENSION: ICD-10-CM

## 2020-11-17 ENCOUNTER — LAB (OUTPATIENT)
Dept: FAMILY MEDICINE CLINIC | Facility: CLINIC | Age: 75
End: 2020-11-17

## 2020-11-17 DIAGNOSIS — I10 BENIGN ESSENTIAL HYPERTENSION: ICD-10-CM

## 2020-11-17 DIAGNOSIS — E78.2 MIXED HYPERLIPIDEMIA: ICD-10-CM

## 2020-11-17 LAB
ALBUMIN SERPL-MCNC: 4.1 G/DL (ref 3.5–5.2)
ALBUMIN/GLOB SERPL: 1.3 G/DL
ALP SERPL-CCNC: 77 U/L (ref 39–117)
ALT SERPL W P-5'-P-CCNC: 18 U/L (ref 1–33)
ANION GAP SERPL CALCULATED.3IONS-SCNC: 9.2 MMOL/L (ref 5–15)
AST SERPL-CCNC: 25 U/L (ref 1–32)
BASOPHILS # BLD AUTO: 0.03 10*3/MM3 (ref 0–0.2)
BASOPHILS NFR BLD AUTO: 0.5 % (ref 0–1.5)
BILIRUB SERPL-MCNC: 0.3 MG/DL (ref 0–1.2)
BILIRUB UR QL STRIP: NEGATIVE
BUN SERPL-MCNC: 22 MG/DL (ref 8–23)
BUN/CREAT SERPL: 23.9 (ref 7–25)
CALCIUM SPEC-SCNC: 9.9 MG/DL (ref 8.6–10.5)
CHLORIDE SERPL-SCNC: 106 MMOL/L (ref 98–107)
CHOLEST SERPL-MCNC: 219 MG/DL (ref 0–200)
CLARITY UR: CLEAR
CO2 SERPL-SCNC: 23.8 MMOL/L (ref 22–29)
COLOR UR: YELLOW
CREAT SERPL-MCNC: 0.92 MG/DL (ref 0.57–1)
DEPRECATED RDW RBC AUTO: 40.5 FL (ref 37–54)
EOSINOPHIL # BLD AUTO: 0.12 10*3/MM3 (ref 0–0.4)
EOSINOPHIL NFR BLD AUTO: 2.1 % (ref 0.3–6.2)
ERYTHROCYTE [DISTWIDTH] IN BLOOD BY AUTOMATED COUNT: 12.3 % (ref 12.3–15.4)
GFR SERPL CREATININE-BSD FRML MDRD: 60 ML/MIN/1.73
GLOBULIN UR ELPH-MCNC: 3.1 GM/DL
GLUCOSE SERPL-MCNC: 85 MG/DL (ref 65–99)
GLUCOSE UR STRIP-MCNC: NEGATIVE MG/DL
HCT VFR BLD AUTO: 39.2 % (ref 34–46.6)
HDLC SERPL-MCNC: 94 MG/DL (ref 40–60)
HGB BLD-MCNC: 13.1 G/DL (ref 12–15.9)
HGB UR QL STRIP.AUTO: NEGATIVE
IMM GRANULOCYTES # BLD AUTO: 0.01 10*3/MM3 (ref 0–0.05)
IMM GRANULOCYTES NFR BLD AUTO: 0.2 % (ref 0–0.5)
KETONES UR QL STRIP: NEGATIVE
LDLC SERPL CALC-MCNC: 104 MG/DL (ref 0–100)
LDLC/HDLC SERPL: 1.07 {RATIO}
LEUKOCYTE ESTERASE UR QL STRIP.AUTO: NEGATIVE
LYMPHOCYTES # BLD AUTO: 1.6 10*3/MM3 (ref 0.7–3.1)
LYMPHOCYTES NFR BLD AUTO: 28.5 % (ref 19.6–45.3)
MCH RBC QN AUTO: 30.3 PG (ref 26.6–33)
MCHC RBC AUTO-ENTMCNC: 33.4 G/DL (ref 31.5–35.7)
MCV RBC AUTO: 90.7 FL (ref 79–97)
MONOCYTES # BLD AUTO: 0.34 10*3/MM3 (ref 0.1–0.9)
MONOCYTES NFR BLD AUTO: 6 % (ref 5–12)
NEUTROPHILS NFR BLD AUTO: 3.52 10*3/MM3 (ref 1.7–7)
NEUTROPHILS NFR BLD AUTO: 62.7 % (ref 42.7–76)
NITRITE UR QL STRIP: NEGATIVE
NRBC BLD AUTO-RTO: 0 /100 WBC (ref 0–0.2)
PH UR STRIP.AUTO: 7.5 [PH] (ref 5–8)
PLATELET # BLD AUTO: 198 10*3/MM3 (ref 140–450)
PMV BLD AUTO: 10.9 FL (ref 6–12)
POTASSIUM SERPL-SCNC: 4.3 MMOL/L (ref 3.5–5.2)
PROT SERPL-MCNC: 7.2 G/DL (ref 6–8.5)
PROT UR QL STRIP: NEGATIVE
RBC # BLD AUTO: 4.32 10*6/MM3 (ref 3.77–5.28)
SODIUM SERPL-SCNC: 139 MMOL/L (ref 136–145)
SP GR UR STRIP: 1.02 (ref 1–1.03)
TRIGL SERPL-MCNC: 122 MG/DL (ref 0–150)
TSH SERPL DL<=0.05 MIU/L-ACNC: 2.7 UIU/ML (ref 0.27–4.2)
UROBILINOGEN UR QL STRIP: NORMAL
VLDLC SERPL-MCNC: 21 MG/DL (ref 5–40)
WBC # BLD AUTO: 5.62 10*3/MM3 (ref 3.4–10.8)

## 2020-11-17 PROCEDURE — 36415 COLL VENOUS BLD VENIPUNCTURE: CPT

## 2020-11-17 PROCEDURE — 81003 URINALYSIS AUTO W/O SCOPE: CPT | Performed by: FAMILY MEDICINE

## 2020-11-17 PROCEDURE — 80053 COMPREHEN METABOLIC PANEL: CPT | Performed by: FAMILY MEDICINE

## 2020-11-17 PROCEDURE — 84443 ASSAY THYROID STIM HORMONE: CPT | Performed by: FAMILY MEDICINE

## 2020-11-17 PROCEDURE — 85025 COMPLETE CBC W/AUTO DIFF WBC: CPT | Performed by: FAMILY MEDICINE

## 2020-11-17 PROCEDURE — 80061 LIPID PANEL: CPT | Performed by: FAMILY MEDICINE

## 2020-11-24 ENCOUNTER — OFFICE VISIT (OUTPATIENT)
Dept: FAMILY MEDICINE CLINIC | Facility: CLINIC | Age: 75
End: 2020-11-24

## 2020-11-24 VITALS
HEART RATE: 96 BPM | TEMPERATURE: 97.3 F | DIASTOLIC BLOOD PRESSURE: 65 MMHG | BODY MASS INDEX: 27.53 KG/M2 | RESPIRATION RATE: 10 BRPM | WEIGHT: 155.4 LBS | SYSTOLIC BLOOD PRESSURE: 102 MMHG

## 2020-11-24 DIAGNOSIS — Z00.00 MEDICARE ANNUAL WELLNESS VISIT, SUBSEQUENT: Primary | ICD-10-CM

## 2020-11-24 DIAGNOSIS — Z12.31 BREAST CANCER SCREENING BY MAMMOGRAM: ICD-10-CM

## 2020-11-24 PROCEDURE — G0439 PPPS, SUBSEQ VISIT: HCPCS | Performed by: FAMILY MEDICINE

## 2020-11-24 RX ORDER — ESCITALOPRAM OXALATE 20 MG/1
20 TABLET ORAL DAILY
Qty: 90 TABLET | Refills: 3 | Status: SHIPPED | OUTPATIENT
Start: 2020-11-24 | End: 2021-01-25 | Stop reason: SDUPTHER

## 2020-11-24 NOTE — PROGRESS NOTES
The ABCs of the Annual Wellness Visit  Subsequent Medicare Wellness Visit    Chief Complaint   Patient presents with   • Medicare Wellness-subsequent       Subjective   History of Present Illness:  Chel Hi is a 74 y.o. female who presents for a Subsequent Medicare Wellness Visit.  74-year-old here who is not really exercising very much.  She is not eating a very nutritious diet and eats too many carbs.  She denies any chest pain or dizziness or syncope when she does exert herself.  She has no BERKOWITZ.  She has no issues with her bowel function except chronic constipation which she does treat.  She has no blood in her stool or blood in her urine.  She does state her memory is pretty good but she is having with some worsening of her depression and having a lot of anxiety and worries a lot about a lot of things.  Her energy levels are getting worse and she is getting more fatigued and has not a lot of motivation.  She is on the Lexapro but it does not seem to be working much anymore at 10 mg daily.  She has no side effects of the medication.    HEALTH RISK ASSESSMENT    Recent Hospitalizations:  No hospitalization(s) within the last year.    Current Medical Providers:  Patient Care Team:  John Santana MD as PCP - General    Smoking Status:  Social History     Tobacco Use   Smoking Status Never Smoker   Smokeless Tobacco Never Used       Alcohol Consumption:  Social History     Substance and Sexual Activity   Alcohol Use No   • Frequency: Never       Depression Screen:   PHQ-2/PHQ-9 Depression Screening 11/24/2020   Little interest or pleasure in doing things 0   Feeling down, depressed, or hopeless 0   Trouble falling or staying asleep, or sleeping too much -   Feeling tired or having little energy -   Poor appetite or overeating -   Feeling bad about yourself - or that you are a failure or have let yourself or your family down -   Trouble concentrating on things, such as reading the newspaper or watching  television -   Moving or speaking so slowly that other people could have noticed. Or the opposite - being so fidgety or restless that you have been moving around a lot more than usual -   Thoughts that you would be better off dead, or of hurting yourself in some way -   Total Score 0   If you checked off any problems, how difficult have these problems made it for you to do your work, take care of things at home, or get along with other people? -       Fall Risk Screen:  LASHA Fall Risk Assessment was completed, and patient is at LOW risk for falls.Assessment completed on:11/24/2020    Health Habits and Functional and Cognitive Screening:  Functional & Cognitive Status 11/24/2020   Do you have difficulty preparing food and eating? No   Do you have difficulty bathing yourself, getting dressed or grooming yourself? No   Do you have difficulty using the toilet? No   Do you have difficulty moving around from place to place? No   Do you have trouble with steps or getting out of a bed or a chair? No   Current Diet Well Balanced Diet   Dental Exam Up to date   Eye Exam Up to date   Exercise (times per week) 0 times per week   Current Exercise Activities Include None   Do you need help using the phone?  No   Are you deaf or do you have serious difficulty hearing?  No   Do you need help with transportation? No   Do you need help shopping? No   Do you need help preparing meals?  No   Do you need help with housework?  No   Do you need help with laundry? No   Do you need help taking your medications? No   Do you need help managing money? No   Do you ever drive or ride in a car without wearing a seat belt? No   Have you felt unusual stress, anger or loneliness in the last month? Yes   Who do you live with? Spouse   If you need help, do you have trouble finding someone available to you? No   Do you have difficulty concentrating, remembering or making decisions? No         Does the patient have evidence of cognitive impairment?  No    Asprin use counseling:Does not need ASA (and currently is not on it)    Age-appropriate Screening Schedule:  Refer to the list below for future screening recommendations based on patient's age, sex and/or medical conditions. Orders for these recommended tests are listed in the plan section. The patient has been provided with a written plan.    Health Maintenance   Topic Date Due   • TDAP/TD VACCINES (1 - Tdap) 12/22/1965   • ZOSTER VACCINE (1 of 2) 12/03/2021 (Originally 12/22/1996)   • MAMMOGRAM  09/25/2021   • DXA SCAN  09/25/2021   • LIPID PANEL  11/17/2021   • COLONOSCOPY  04/11/2027   • INFLUENZA VACCINE  Completed          The following portions of the patient's history were reviewed and updated as appropriate: allergies, current medications, past family history, past medical history, past social history, past surgical history and problem list.    Outpatient Medications Prior to Visit   Medication Sig Dispense Refill   • ARIPiprazole (ABILIFY) 2 MG tablet TAKE 1 TABLET DAILY 90 tablet 3   • Chlorcyclizine-Pseudoephed (STAHIST AD) 25-60 MG tablet STAHIST AD 25-60 MG TABS     • DULoxetine (CYMBALTA) 30 MG capsule TAKE 1 CAPSULE DAILY 90 capsule 3   • hydroCHLOROthiazide (MICROZIDE) 12.5 MG capsule TAKE 1 CAPSULE DAILY 90 capsule 3   • meloxicam (MOBIC) 15 MG tablet TAKE 1 TABLET DAILY WITH   FOOD FOR ARTHRITIS 90 tablet 3   • omeprazole (priLOSEC) 40 MG capsule TAKE 1 CAPSULE DAILY 90 capsule 3   • tiZANidine (ZANAFLEX) 4 MG tablet TAKE 1 TABLET EVERY NIGHT 90 tablet 3   • traZODone (DESYREL) 50 MG tablet TAKE 1 TABLET EVERY NIGHT 90 tablet 3   • escitalopram (LEXAPRO) 10 MG tablet TAKE 1 TABLET DAILY 90 tablet 3     No facility-administered medications prior to visit.        Patient Active Problem List   Diagnosis   • Absolute anemia   • Anxiety   • Arthritis   • Depression   • Benign essential hypertension   • Hyperlipidemia   • Fibromyalgia   • Restless leg syndrome   • Esophageal reflux   • Fatigue   •  Insomnia   • Leg pain, bilateral   • Night sweats   • Palpitations   • Spinal stenosis of lumbar region   • Trochanteric bursitis   • B12 deficiency   • Vitamin B deficiency   • Anemia in other chronic diseases classified elsewhere   • Polyarthropathy or polyarthritis   • Medicare annual wellness visit, subsequent       Advanced Care Planning:  ACP discussion was held with the patient during this visit. Patient has an advance directive in EMR which is still valid.     Review of Systems   Constitutional: Negative for chills, fatigue and fever.   HENT: Negative for congestion and nosebleeds.    Respiratory: Negative for chest tightness and shortness of breath.    Cardiovascular: Negative for chest pain and palpitations.   Gastrointestinal: Negative for abdominal pain and blood in stool.   Endocrine: Negative for polydipsia and polyuria.   Genitourinary: Negative for dysuria and hematuria.   Neurological: Negative for dizziness and syncope.   Hematological: Negative for adenopathy. Does not bruise/bleed easily.   Psychiatric/Behavioral: Negative for confusion. The patient is not nervous/anxious.        Compared to one year ago, the patient feels her physical health is the same.  Compared to one year ago, the patient feels her mental health is the same.    Reviewed chart for potential of high risk medication in the elderly: yes  Reviewed chart for potential of harmful drug interactions in the elderly:yes    Objective         Vitals:    11/24/20 1324   BP: 102/65   Pulse: 96   Resp: 10   Temp: 97.3 °F (36.3 °C)   Weight: 70.5 kg (155 lb 6.4 oz)       Body mass index is 27.53 kg/m².  Discussed the patient's BMI with her. The BMI is above average; BMI management plan is completed.    Physical Exam  Vitals signs and nursing note reviewed.   Constitutional:       General: She is not in acute distress.     Appearance: Normal appearance. She is well-developed.   HENT:      Head: Normocephalic and atraumatic.      Right Ear:  Tympanic membrane and ear canal normal.      Left Ear: Tympanic membrane and ear canal normal.      Nose: Nose normal.      Mouth/Throat:      Mouth: Mucous membranes are moist.      Pharynx: Oropharynx is clear.   Eyes:      General: Lids are normal.      Extraocular Movements: Extraocular movements intact.      Conjunctiva/sclera: Conjunctivae normal.      Pupils: Pupils are equal, round, and reactive to light.   Neck:      Musculoskeletal: Normal range of motion and neck supple.      Thyroid: No thyroid mass or thyromegaly.      Vascular: No carotid bruit or JVD.      Trachea: Trachea normal.      Comments: No carotid bruits  Cardiovascular:      Rate and Rhythm: Normal rate and regular rhythm.      Pulses: Normal pulses.      Heart sounds: Normal heart sounds.   Pulmonary:      Effort: Pulmonary effort is normal.      Breath sounds: Normal breath sounds.   Abdominal:      General: Bowel sounds are normal. There is no distension.      Palpations: Abdomen is soft.      Tenderness: There is no abdominal tenderness. There is no guarding.   Musculoskeletal:      Comments: No c/c/e   Skin:     General: Skin is warm and dry.   Neurological:      General: No focal deficit present.      Mental Status: She is alert and oriented to person, place, and time.      Cranial Nerves: No cranial nerve deficit.   Psychiatric:         Attention and Perception: She is attentive.         Mood and Affect: Mood normal.         Speech: Speech normal.         Behavior: Behavior normal.         Thought Content: Thought content normal.         Judgment: Judgment normal.         Lab Results   Component Value Date    TRIG 122 11/17/2020    HDL 94 (H) 11/17/2020     (H) 11/17/2020    VLDL 21 11/17/2020        Assessment/Plan   Medicare Risks and Personalized Health Plan  CMS Preventative Services Quick Reference  Advance Directive Discussion  Breast Cancer/Mammogram Screening  Chronic Pain   Colon Cancer Screening  Dementia/Memory    Depression/Dysphoria  Diabetic Lab Screening   Fall Risk  Glaucoma Risk  Hearing Problem  Immunizations Discussed/Encouraged (specific immunizations; adacel Tdap, Influenza, Pneumococcal 23, Prevnar and Shingrix )  Inadequate Social Support, Isolation, Loneliness, Lack of Transportation, Financial Difficulties, or Caregiver Stress   Inactivity/Sedentary  Lung Cancer Risk  Obesity/Overweight   Osteoprorosis Risk    The above risks/problems have been discussed with the patient.  Pertinent information has been shared with the patient in the After Visit Summary.  Follow up plans and orders are seen below in the Assessment/Plan Section.    Diagnoses and all orders for this visit:    1. Medicare annual wellness visit, subsequent (Primary)  Assessment & Plan:  recommend regular aerobic exercise, for cv yajaira and weight loss.  Recommend a nutritious diet high in fiber and vegetable, low in carbohydrate, eating leaner cuts of meat higher amounts fish.  Immunizations discussed with the patient as well.      2. Breast cancer screening by mammogram  -     Mammo Screening Digital Tomosynthesis Bilateral With CAD; Future    Other orders  -     escitalopram (Lexapro) 20 MG tablet; Take 1 tablet by mouth Daily.  Dispense: 90 tablet; Refill: 3    Follow Up:  Return in about 1 year (around 11/24/2021) for Annual physical.     An After Visit Summary and PPPS were given to the patient.

## 2020-11-27 NOTE — ASSESSMENT & PLAN NOTE
recommend regular aerobic exercise, for cv yajaira and weight loss.  Recommend a nutritious diet high in fiber and vegetable, low in carbohydrate, eating leaner cuts of meat higher amounts fish.  Immunizations discussed with the patient as well.

## 2020-12-08 ENCOUNTER — HOSPITAL ENCOUNTER (OUTPATIENT)
Dept: MAMMOGRAPHY | Facility: HOSPITAL | Age: 75
Discharge: HOME | End: 2020-12-08
Admitting: FAMILY MEDICINE

## 2020-12-08 DIAGNOSIS — Z12.31 BREAST CANCER SCREENING BY MAMMOGRAM: ICD-10-CM

## 2020-12-08 PROCEDURE — 77067 SCR MAMMO BI INCL CAD: CPT

## 2020-12-08 PROCEDURE — 77063 BREAST TOMOSYNTHESIS BI: CPT

## 2021-01-25 RX ORDER — DULOXETIN HYDROCHLORIDE 30 MG/1
30 CAPSULE, DELAYED RELEASE ORAL DAILY
Qty: 90 CAPSULE | Refills: 3 | Status: SHIPPED | OUTPATIENT
Start: 2021-01-25 | End: 2022-01-27 | Stop reason: SDUPTHER

## 2021-01-25 RX ORDER — ESCITALOPRAM OXALATE 20 MG/1
20 TABLET ORAL DAILY
Qty: 90 TABLET | Refills: 3 | Status: SHIPPED | OUTPATIENT
Start: 2021-01-25 | End: 2022-01-27 | Stop reason: SDUPTHER

## 2021-01-25 RX ORDER — HYDROCHLOROTHIAZIDE 12.5 MG/1
CAPSULE, GELATIN COATED ORAL
Qty: 90 CAPSULE | Refills: 3 | Status: SHIPPED | OUTPATIENT
Start: 2021-01-25 | End: 2022-01-27 | Stop reason: SDUPTHER

## 2021-01-25 RX ORDER — MELOXICAM 15 MG/1
15 TABLET ORAL DAILY
Qty: 90 TABLET | Refills: 3 | Status: SHIPPED | OUTPATIENT
Start: 2021-01-25 | End: 2022-01-27 | Stop reason: SDUPTHER

## 2021-01-25 RX ORDER — OMEPRAZOLE 40 MG/1
40 CAPSULE, DELAYED RELEASE ORAL DAILY
Qty: 90 CAPSULE | Refills: 3 | Status: SHIPPED | OUTPATIENT
Start: 2021-01-25 | End: 2022-01-27 | Stop reason: SDUPTHER

## 2021-04-20 ENCOUNTER — OFFICE VISIT (OUTPATIENT)
Dept: FAMILY MEDICINE CLINIC | Facility: CLINIC | Age: 76
End: 2021-04-20

## 2021-04-20 VITALS
BODY MASS INDEX: 27.32 KG/M2 | DIASTOLIC BLOOD PRESSURE: 76 MMHG | SYSTOLIC BLOOD PRESSURE: 112 MMHG | TEMPERATURE: 97.3 F | WEIGHT: 154.2 LBS | OXYGEN SATURATION: 98 % | HEART RATE: 90 BPM | HEIGHT: 63 IN

## 2021-04-20 DIAGNOSIS — M79.7 FIBROMYALGIA: ICD-10-CM

## 2021-04-20 DIAGNOSIS — F41.9 ANXIETY: ICD-10-CM

## 2021-04-20 DIAGNOSIS — F33.40 RECURRENT MAJOR DEPRESSIVE DISORDER, IN REMISSION (HCC): Primary | ICD-10-CM

## 2021-04-20 PROBLEM — E53.8 B12 DEFICIENCY: Status: RESOLVED | Noted: 2017-04-10 | Resolved: 2021-04-20

## 2021-04-20 PROBLEM — Z00.00 MEDICARE ANNUAL WELLNESS VISIT, SUBSEQUENT: Status: RESOLVED | Noted: 2019-09-17 | Resolved: 2021-04-20

## 2021-04-20 PROCEDURE — 99213 OFFICE O/P EST LOW 20 MIN: CPT | Performed by: NURSE PRACTITIONER

## 2021-04-20 NOTE — ASSESSMENT & PLAN NOTE
Patient's depression is recurrent and is moderate without psychosis. Their depression is currently in partial remission and the condition is improving with treatment. This will be reassessed at the next regular appointment. F/U as described:patient will continue current medication therapy.  Pt is doing well on current treatment regimen of lexapro and abilify. Will cont same meds for now.

## 2021-04-20 NOTE — PROGRESS NOTES
"Subjective   Chel Hi is a 75 y.o. female.     Chief Complaint   Patient presents with   • Establish Care       /76 (BP Location: Right arm, Patient Position: Sitting, Cuff Size: Adult)   Pulse 90   Temp 97.3 °F (36.3 °C) (Skin)   Ht 160 cm (63\")   Wt 69.9 kg (154 lb 3.2 oz)   SpO2 98%   BMI 27.32 kg/m²     BP Readings from Last 3 Encounters:   04/20/21 112/76   11/24/20 102/65   09/17/19 100/61       Wt Readings from Last 3 Encounters:   04/20/21 69.9 kg (154 lb 3.2 oz)   11/24/20 70.5 kg (155 lb 6.4 oz)   09/17/19 64.9 kg (143 lb)       Pt comes in today to establish care with myself since Dr. Santana is no longer in practice, and also to follow up on depression. She is currently taking lexapro and abilify and stable on both. She also takes 30mg cymablta daily, but states that is for her fibro. It also works well for her. Overall doing well. No specific concerns today        The following portions of the patient's history were reviewed and updated as appropriate: allergies, current medications, past family history, past medical history, past social history, past surgical history and problem list.    Review of Systems   Constitutional: Negative for fatigue.   Eyes: Negative for blurred vision.   Respiratory: Negative for shortness of breath.    Cardiovascular: Negative for chest pain and palpitations.   Neurological: Negative for dizziness and headache.   Psychiatric/Behavioral: Negative for sleep disturbance, depressed mood and stress. The patient is not nervous/anxious.        Objective   Physical Exam  Constitutional:       Appearance: She is well-developed.   Eyes:      Pupils: Pupils are equal, round, and reactive to light.   Cardiovascular:      Rate and Rhythm: Normal rate and regular rhythm.   Pulmonary:      Effort: Pulmonary effort is normal.      Breath sounds: Normal breath sounds.   Neurological:      Mental Status: She is alert and oriented to person, place, and time.   Psychiatric: "         Mood and Affect: Mood normal.         Behavior: Behavior normal.         Thought Content: Thought content normal.           Diagnoses and all orders for this visit:    1. Recurrent major depressive disorder, in remission (CMS/HCC) (Primary)  Assessment & Plan:  Patient's depression is recurrent and is moderate without psychosis. Their depression is currently in partial remission and the condition is improving with treatment. This will be reassessed at the next regular appointment. F/U as described:patient will continue current medication therapy.  Pt is doing well on current treatment regimen of lexapro and abilify. Will cont same meds for now.       2. Anxiety    3. Fibromyalgia  During this office visit, we discussed the pertinent aspects of the visit and treatment recommendations. Pt verbalizes understanding. Follow up was discussed. Patient was given the opportunity to ask questions and discuss other concerns.       Return in about 7 months (around 11/20/2021) for Medicare Wellness.

## 2021-05-11 ENCOUNTER — TELEPHONE (OUTPATIENT)
Dept: FAMILY MEDICINE CLINIC | Facility: CLINIC | Age: 76
End: 2021-05-11

## 2021-05-11 NOTE — TELEPHONE ENCOUNTER
Caller: Chel iH    Relationship to patient: Self    Best call back number: [phone]    Patient is needing: PATIENT CALLED AND SAID THAT SHE HAS BEEN RECENTLY HAVING DIZZY SPELLS AND HAD FALLEN DURING A RECENT ONE, SHE SCHEDULED AN APPOINTMENT Thursday, 05/13/21 WITH DR. FRITZ TO BE SEEN IN THE OFFICE     HUB COULD NOT LOCATE SAME DAY APPOINTMENT

## 2021-05-13 ENCOUNTER — OFFICE VISIT (OUTPATIENT)
Dept: FAMILY MEDICINE CLINIC | Facility: CLINIC | Age: 76
End: 2021-05-13

## 2021-05-13 ENCOUNTER — HOSPITAL ENCOUNTER (EMERGENCY)
Facility: HOSPITAL | Age: 76
Discharge: HOME | End: 2021-05-13
Admitting: EMERGENCY MEDICINE

## 2021-05-13 ENCOUNTER — APPOINTMENT (OUTPATIENT)
Dept: CT IMAGING | Facility: HOSPITAL | Age: 76
End: 2021-05-13

## 2021-05-13 VITALS
HEART RATE: 81 BPM | TEMPERATURE: 96.8 F | HEIGHT: 63 IN | RESPIRATION RATE: 10 BRPM | DIASTOLIC BLOOD PRESSURE: 71 MMHG | OXYGEN SATURATION: 97 % | WEIGHT: 154.6 LBS | SYSTOLIC BLOOD PRESSURE: 123 MMHG | BODY MASS INDEX: 27.39 KG/M2

## 2021-05-13 VITALS
HEART RATE: 65 BPM | RESPIRATION RATE: 14 BRPM | TEMPERATURE: 97.6 F | HEIGHT: 65 IN | SYSTOLIC BLOOD PRESSURE: 144 MMHG | DIASTOLIC BLOOD PRESSURE: 57 MMHG | BODY MASS INDEX: 24.98 KG/M2 | WEIGHT: 149.91 LBS | OXYGEN SATURATION: 99 %

## 2021-05-13 DIAGNOSIS — R42 DIZZINESSES: ICD-10-CM
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
## 2021-05-13 DIAGNOSIS — S09.90XA CLOSED HEAD INJURY, INITIAL ENCOUNTER: ICD-10-CM

## 2021-05-13 DIAGNOSIS — S30.1XXA CONTUSION OF ABDOMINAL WALL, INITIAL ENCOUNTER: ICD-10-CM

## 2021-05-13 LAB
ALBUMIN SERPL-MCNC: 3.9 G/DL (ref 3.5–5.2)
ALBUMIN/GLOB SERPL: 1.3 G/DL
ALP SERPL-CCNC: 81 U/L (ref 39–117)
ALT SERPL W P-5'-P-CCNC: 14 U/L (ref 1–33)
ANION GAP SERPL CALCULATED.3IONS-SCNC: 11 MMOL/L (ref 5–15)
AST SERPL-CCNC: 19 U/L (ref 1–32)
BASOPHILS # BLD AUTO: 0 10*3/MM3 (ref 0–0.2)
BASOPHILS NFR BLD AUTO: 0.6 % (ref 0–1.5)
BILIRUB SERPL-MCNC: 0.3 MG/DL (ref 0–1.2)
BUN SERPL-MCNC: 29 MG/DL (ref 8–23)
BUN/CREAT SERPL: 25.4 (ref 7–25)
CALCIUM SPEC-SCNC: 9.6 MG/DL (ref 8.6–10.5)
CHLORIDE SERPL-SCNC: 104 MMOL/L (ref 98–107)
CO2 SERPL-SCNC: 24 MMOL/L (ref 22–29)
CREAT SERPL-MCNC: 1.14 MG/DL (ref 0.57–1)
DEPRECATED RDW RBC AUTO: 41.1 FL (ref 37–54)
EOSINOPHIL # BLD AUTO: 0.1 10*3/MM3 (ref 0–0.4)
EOSINOPHIL NFR BLD AUTO: 1.8 % (ref 0.3–6.2)
ERYTHROCYTE [DISTWIDTH] IN BLOOD BY AUTOMATED COUNT: 13.2 % (ref 12.3–15.4)
GFR SERPL CREATININE-BSD FRML MDRD: 47 ML/MIN/1.73
GLOBULIN UR ELPH-MCNC: 3 GM/DL
GLUCOSE SERPL-MCNC: 83 MG/DL (ref 65–99)
HCT VFR BLD AUTO: 35.9 % (ref 34–46.6)
HGB BLD-MCNC: 12.2 G/DL (ref 12–15.9)
LYMPHOCYTES # BLD AUTO: 2 10*3/MM3 (ref 0.7–3.1)
LYMPHOCYTES NFR BLD AUTO: 31.1 % (ref 19.6–45.3)
MCH RBC QN AUTO: 30.8 PG (ref 26.6–33)
MCHC RBC AUTO-ENTMCNC: 33.8 G/DL (ref 31.5–35.7)
MCV RBC AUTO: 90.9 FL (ref 79–97)
MONOCYTES # BLD AUTO: 0.6 10*3/MM3 (ref 0.1–0.9)
MONOCYTES NFR BLD AUTO: 10 % (ref 5–12)
NEUTROPHILS NFR BLD AUTO: 3.6 10*3/MM3 (ref 1.7–7)
NEUTROPHILS NFR BLD AUTO: 56.5 % (ref 42.7–76)
NRBC BLD AUTO-RTO: 0 /100 WBC (ref 0–0.2)
PLATELET # BLD AUTO: 216 10*3/MM3 (ref 140–450)
PMV BLD AUTO: 7.6 FL (ref 6–12)
POTASSIUM SERPL-SCNC: 4.1 MMOL/L (ref 3.5–5.2)
PROT SERPL-MCNC: 6.9 G/DL (ref 6–8.5)
RBC # BLD AUTO: 3.95 10*6/MM3 (ref 3.77–5.28)
SODIUM SERPL-SCNC: 139 MMOL/L (ref 136–145)
WBC # BLD AUTO: 6.4 10*3/MM3 (ref 3.4–10.8)

## 2021-05-13 PROCEDURE — 85025 COMPLETE CBC W/AUTO DIFF WBC: CPT | Performed by: NURSE PRACTITIONER

## 2021-05-13 PROCEDURE — 80053 COMPREHEN METABOLIC PANEL: CPT | Performed by: NURSE PRACTITIONER

## 2021-05-13 PROCEDURE — 70450 CT HEAD/BRAIN W/O DYE: CPT

## 2021-05-13 PROCEDURE — 99283 EMERGENCY DEPT VISIT LOW MDM: CPT

## 2021-05-13 PROCEDURE — 99214 OFFICE O/P EST MOD 30 MIN: CPT | Performed by: FAMILY MEDICINE

## 2021-05-13 RX ORDER — SODIUM CHLORIDE 0.9 % (FLUSH) 0.9 %
10 SYRINGE (ML) INJECTION AS NEEDED
Status: DISCONTINUED | OUTPATIENT
Start: 2021-05-13 | End: 2021-05-13 | Stop reason: HOSPADM

## 2021-05-13 NOTE — PROGRESS NOTES
"Chief Complaint  Dizziness    Subjective          Chel Hi presents to [facility]  History of Present Illness   Patient is a 75 year old female who presents five days after falling and hitting her head and chest wall and abdomen;  She had been experiencing some nausea and vomiting for about 24 hours which she felt was likely a \"stomach bug.\"  In the evening when she got up to go to bed she felt very dizzy and fell into the end table and onto the floor; she remembers hitting her head in the back;  She does not think she lost consciousness but was very dizzy at the time;  Since then, she has been experiencing episodes of severe dizziness where she feels like she is spinning; she often feels like the is falling to the right when walking;  She denies excessive sleepiness;  Feels a pressure in her head but says it is not really painful; she also has pain in her lower sternum which began after the fall;  There is no associated shortness of breath but it does hurt (8 out of 10) to take a deep breath;  There is no pain in her arms or legs or back;  She has noticed a large bruise on the right abdomen as well which is tender.  She has not vomited since the resolution of the 24 hour \"stomach bug,\" but she does report frequently feeling nauseated.  No fevers, chills, sweats.    Objective   Vital Signs:   /71   Pulse 81   Temp 96.8 °F (36 °C)   Resp 10   Ht 160 cm (63\")   Wt 70.1 kg (154 lb 9.6 oz)   SpO2 97%   BMI 27.39 kg/m²     Physical Exam   General:  Alert and oriented, appears anxious at times and uncomfortable at times with inhalation  HEENT:  Normocephalic, non traumatic appearing head;  There is no pain with palpation of the facial bones;  Mild discomfort with palpation of the occipital area;  Eyes:  Pupils equal round and reactive to light;    Neck:  Supple with normal range of motion  Abdomen:  Right mid abdomen there is a grapefruit sized bruise in varying " stages of discoloration;  It is firm directly below the bruise and mildly tender; there is no rebound or guarding;  Normal bowel sounds  Back:  No CVA tenderness  Neuro:  Abnormal Romberg with stumbling forward and to the right, recovered quickly without incident;  Strength 5/5 x 4 extremities  Psych:  Affect difficult to evaluate as I do not know patient's baseline;  She answers questions slowly but appropriately; she does appear anxious at times.    Result Review :                 Assessment and Plan    Diagnoses and all orders for this visit:    1. Fall, initial encounter (Primary)    2. Dizzinesses    3. Contusion of abdominal wall, initial encounter    Patient's fall likely related to volume depletion;  She did sustain significant blow to head, and I am concerned about her level of continued dizziness as well as abnormal Romberg and gait;  It is difficult to evaluate her speech and her affect as I do not know her baseline;  She reports significant difficulty breathing due to sternal pain and has a large hematoma on the middle to right abdomen;  After discussion of options of pursuing outpatient testing or evaluation in the emergency room, it was decided that her symptoms would be most safely evaluated in the emergency room; the emergency room provider was contacted.    I spent 30 minutes caring for Chel on this date of service. This time includes time spent by me in the following activities:preparing for the visit, performing a medically appropriate examination and/or evaluation , counseling and educating the patient/family/caregiver, referring and communicating with other health care professionals , documenting information in the medical record and care coordination  Follow Up {Instructions Charge Capture  Follow-up Communications :23}  No follow-ups on file.  Patient was given instructions and counseling regarding her condition or for health maintenance advice. Please see specific information pulled into  the AVS if appropriate.

## 2021-08-18 RX ORDER — TRAZODONE HYDROCHLORIDE 50 MG/1
50 TABLET ORAL NIGHTLY
Qty: 90 TABLET | Refills: 1 | Status: SHIPPED | OUTPATIENT
Start: 2021-08-18

## 2021-08-18 RX ORDER — ARIPIPRAZOLE 2 MG/1
2 TABLET ORAL DAILY
Qty: 90 TABLET | Refills: 1 | Status: SHIPPED | OUTPATIENT
Start: 2021-08-18 | End: 2022-02-07

## 2021-09-23 RX ORDER — TIZANIDINE 4 MG/1
4 TABLET ORAL NIGHTLY
Qty: 90 TABLET | Refills: 1 | Status: SHIPPED | OUTPATIENT
Start: 2021-09-23 | End: 2022-03-10 | Stop reason: SDUPTHER

## 2021-09-23 NOTE — TELEPHONE ENCOUNTER
Caller: IsaiasmathewChel cortes    Relationship: Self      Medication requested (name and dosage): tiZANidine (ZANAFLEX) 4 MG    [facility] where [facility] be sent[facility]   Pharmacy - [place], [place] - [address] AT [address] [facility] - [phone]  - [phone]   [phone]    Additional details provided by patient: MS. STOUT SAYS THAT [facility] HAS BEEN SENDING 3 REQUEST, NO RESPONSE. PLEASE ADVISE IF SHE IS NEEDING TO BE SEEN.    90 DAY SUPPLY REQUESTED     Best call back number: [phone]   Does the patient have less than a 3 day supply:  [] Yes  [x] No    Christine Waller   09/23/21 14:51 EDT

## 2021-09-23 NOTE — TELEPHONE ENCOUNTER
She has Esther, Elisha, and Kiran.  They maybe why.     04/20/2021 she seen [facility]

## 2022-01-27 ENCOUNTER — LAB (OUTPATIENT)
Dept: FAMILY MEDICINE CLINIC | Facility: CLINIC | Age: 77
End: 2022-01-27

## 2022-01-27 ENCOUNTER — OFFICE VISIT (OUTPATIENT)
Dept: FAMILY MEDICINE CLINIC | Facility: CLINIC | Age: 77
End: 2022-01-27

## 2022-01-27 VITALS — BODY MASS INDEX: 24.95 KG/M2 | RESPIRATION RATE: 16 BRPM | HEIGHT: 65 IN

## 2022-01-27 DIAGNOSIS — K21.9 GASTROESOPHAGEAL REFLUX DISEASE WITHOUT ESOPHAGITIS: ICD-10-CM

## 2022-01-27 DIAGNOSIS — F33.40 RECURRENT MAJOR DEPRESSIVE DISORDER, IN REMISSION: ICD-10-CM

## 2022-01-27 DIAGNOSIS — N39.0 URINARY TRACT INFECTION WITHOUT HEMATURIA, SITE UNSPECIFIED: ICD-10-CM

## 2022-01-27 DIAGNOSIS — G47.00 INSOMNIA, UNSPECIFIED TYPE: ICD-10-CM

## 2022-01-27 DIAGNOSIS — Z12.31 ENCOUNTER FOR SCREENING MAMMOGRAM FOR MALIGNANT NEOPLASM OF BREAST: ICD-10-CM

## 2022-01-27 DIAGNOSIS — M79.7 FIBROMYALGIA: ICD-10-CM

## 2022-01-27 DIAGNOSIS — I10 BENIGN ESSENTIAL HYPERTENSION: ICD-10-CM

## 2022-01-27 DIAGNOSIS — Z00.00 ANNUAL PHYSICAL EXAM: ICD-10-CM

## 2022-01-27 DIAGNOSIS — Z00.00 MEDICARE ANNUAL WELLNESS VISIT, SUBSEQUENT: Primary | ICD-10-CM

## 2022-01-27 LAB
ALBUMIN SERPL-MCNC: 4.4 G/DL (ref 3.5–5.2)
ALBUMIN/GLOB SERPL: 1.5 G/DL
ALP SERPL-CCNC: 76 U/L (ref 39–117)
ALT SERPL W P-5'-P-CCNC: 13 U/L (ref 1–33)
ANION GAP SERPL CALCULATED.3IONS-SCNC: 12.6 MMOL/L (ref 5–15)
AST SERPL-CCNC: 19 U/L (ref 1–32)
BACTERIA UR QL AUTO: ABNORMAL /HPF
BILIRUB SERPL-MCNC: 0.4 MG/DL (ref 0–1.2)
BILIRUB UR QL STRIP: NEGATIVE
BUN SERPL-MCNC: 26 MG/DL (ref 8–23)
BUN/CREAT SERPL: 21.1 (ref 7–25)
CALCIUM SPEC-SCNC: 9.8 MG/DL (ref 8.6–10.5)
CHLORIDE SERPL-SCNC: 101 MMOL/L (ref 98–107)
CHOLEST SERPL-MCNC: 239 MG/DL (ref 0–200)
CLARITY UR: CLEAR
CO2 SERPL-SCNC: 23.4 MMOL/L (ref 22–29)
COLOR UR: YELLOW
CREAT SERPL-MCNC: 1.23 MG/DL (ref 0.57–1)
DEPRECATED RDW RBC AUTO: 41.8 FL (ref 37–54)
ERYTHROCYTE [DISTWIDTH] IN BLOOD BY AUTOMATED COUNT: 12.4 % (ref 12.3–15.4)
GFR SERPL CREATININE-BSD FRML MDRD: 43 ML/MIN/1.73
GLOBULIN UR ELPH-MCNC: 3 GM/DL
GLUCOSE SERPL-MCNC: 74 MG/DL (ref 65–99)
GLUCOSE UR STRIP-MCNC: NEGATIVE MG/DL
HCT VFR BLD AUTO: 41.1 % (ref 34–46.6)
HDLC SERPL-MCNC: 103 MG/DL (ref 40–60)
HGB BLD-MCNC: 13.8 G/DL (ref 12–15.9)
HGB UR QL STRIP.AUTO: NEGATIVE
HYALINE CASTS UR QL AUTO: ABNORMAL /LPF
KETONES UR QL STRIP: NEGATIVE
LDLC SERPL CALC-MCNC: 122 MG/DL (ref 0–100)
LDLC/HDLC SERPL: 1.16 {RATIO}
LEUKOCYTE ESTERASE UR QL STRIP.AUTO: ABNORMAL
MCH RBC QN AUTO: 31.2 PG (ref 26.6–33)
MCHC RBC AUTO-ENTMCNC: 33.6 G/DL (ref 31.5–35.7)
MCV RBC AUTO: 93 FL (ref 79–97)
NITRITE UR QL STRIP: NEGATIVE
PH UR STRIP.AUTO: 6 [PH] (ref 5–8)
PLATELET # BLD AUTO: 230 10*3/MM3 (ref 140–450)
PMV BLD AUTO: 10.6 FL (ref 6–12)
POTASSIUM SERPL-SCNC: 3.9 MMOL/L (ref 3.5–5.2)
PROT SERPL-MCNC: 7.4 G/DL (ref 6–8.5)
PROT UR QL STRIP: ABNORMAL
RBC # BLD AUTO: 4.42 10*6/MM3 (ref 3.77–5.28)
RBC # UR STRIP: ABNORMAL /HPF
REF LAB TEST METHOD: ABNORMAL
SODIUM SERPL-SCNC: 137 MMOL/L (ref 136–145)
SP GR UR STRIP: 1.02 (ref 1–1.03)
SQUAMOUS #/AREA URNS HPF: ABNORMAL /HPF
TRIGL SERPL-MCNC: 85 MG/DL (ref 0–150)
UROBILINOGEN UR QL STRIP: ABNORMAL
VLDLC SERPL-MCNC: 14 MG/DL (ref 5–40)
WBC # UR STRIP: ABNORMAL /HPF
WBC NRBC COR # BLD: 6.41 10*3/MM3 (ref 3.4–10.8)

## 2022-01-27 PROCEDURE — 99397 PER PM REEVAL EST PAT 65+ YR: CPT | Performed by: NURSE PRACTITIONER

## 2022-01-27 PROCEDURE — 81001 URINALYSIS AUTO W/SCOPE: CPT | Performed by: NURSE PRACTITIONER

## 2022-01-27 PROCEDURE — 85027 COMPLETE CBC AUTOMATED: CPT | Performed by: NURSE PRACTITIONER

## 2022-01-27 PROCEDURE — 80053 COMPREHEN METABOLIC PANEL: CPT | Performed by: NURSE PRACTITIONER

## 2022-01-27 PROCEDURE — 1170F FXNL STATUS ASSESSED: CPT | Performed by: NURSE PRACTITIONER

## 2022-01-27 PROCEDURE — G0439 PPPS, SUBSEQ VISIT: HCPCS | Performed by: NURSE PRACTITIONER

## 2022-01-27 PROCEDURE — 80061 LIPID PANEL: CPT | Performed by: NURSE PRACTITIONER

## 2022-01-27 PROCEDURE — 36415 COLL VENOUS BLD VENIPUNCTURE: CPT

## 2022-01-27 PROCEDURE — 1160F RVW MEDS BY RX/DR IN RCRD: CPT | Performed by: NURSE PRACTITIONER

## 2022-01-27 RX ORDER — MELOXICAM 15 MG/1
15 TABLET ORAL DAILY
Qty: 90 TABLET | Refills: 3 | Status: SHIPPED | OUTPATIENT
Start: 2022-01-27 | End: 2023-01-09

## 2022-01-27 RX ORDER — HYDROCHLOROTHIAZIDE 12.5 MG/1
12.5 CAPSULE, GELATIN COATED ORAL DAILY
Qty: 90 CAPSULE | Refills: 3 | Status: SHIPPED | OUTPATIENT
Start: 2022-01-27 | End: 2023-01-09

## 2022-01-27 RX ORDER — ESCITALOPRAM OXALATE 20 MG/1
20 TABLET ORAL DAILY
Qty: 90 TABLET | Refills: 3 | Status: SHIPPED | OUTPATIENT
Start: 2022-01-27

## 2022-01-27 RX ORDER — OMEPRAZOLE 40 MG/1
40 CAPSULE, DELAYED RELEASE ORAL DAILY
Qty: 90 CAPSULE | Refills: 3 | Status: SHIPPED | OUTPATIENT
Start: 2022-01-27 | End: 2023-01-09

## 2022-01-27 RX ORDER — DULOXETIN HYDROCHLORIDE 30 MG/1
30 CAPSULE, DELAYED RELEASE ORAL DAILY
Qty: 90 CAPSULE | Refills: 3 | Status: SHIPPED | OUTPATIENT
Start: 2022-01-27 | End: 2023-01-09

## 2022-01-27 NOTE — PATIENT INSTRUCTIONS

## 2022-01-27 NOTE — PROGRESS NOTES
"The ABCs of the Annual Wellness Visit  Subsequent Medicare Wellness Visit    Chief Complaint   Patient presents with   • Medicare Wellness-subsequent      Subjective    History of Present Illness:  Chel Hi is a 76 y.o. female who presents for a Subsequent Medicare Wellness Visit and annual physical.  Mammogram is due, she denies any new problems or concerns, but tells me that she has a hard time sleeping -   Has trouble falling asleep, is kept awake by her legs aching and hurting.  Has used trazodonein the past but that tended to make her feel sleepy in in the morning.  She tells me that she exercises with a pedal machine, at least 2-3 times weekly, sometimes more frequently.  She also thinks that she eats a pretty nutritious diet.  She denies nay c/o chest pain , soa, edema,ha, dizziness, or weakness.  She has an electronic iStem implantable device to help with bowel leakage - she tells me that she gets fair results with that, it is not 100% effective.  She denies any problems with urination, she has not noticed any blood in her stool or urine.  She admits that she has trouble with depression, she feels like her mood is pretty well controlled with her current medicines.    She denies any medication side effects.    Will try taking trazodone 25mg and only 1 4mg tizanidine tablet for sleep     Blood pressure is currently well controlled  She denies any c/o chest pain, soa, edema, ha, dizziness, weakness    Review of Systems   Constitutional: Positive for fatigue.   Cardiovascular: Negative.  Negative for chest pain, palpitations and leg swelling.   Gastrointestinal: Negative for abdominal pain and constipation.        Has \"bowel leakage\" with istem implanted   Genitourinary: Negative.    Musculoskeletal: Positive for myalgias.        H/o fibromyalgia   Neurological: Negative for dizziness, weakness and headaches.   Psychiatric/Behavioral: Positive for dysphoric mood and sleep disturbance.       The " following portions of the patient's history were reviewed and   updated as appropriate: allergies, current medications, past family history, past medical history, past social history, past surgical history and problem list.    Compared to one year ago, the patient feels her physical   health is the same.    Compared to one year ago, the patient feels her mental   health is the same.    Recent Hospitalizations:  She was not admitted to the hospital during the last year.       Current Medical Providers:  Patient Care Team:  Jenna Lind APRN as PCP - General (Family Medicine)    Outpatient Medications Prior to Visit   Medication Sig Dispense Refill   • ARIPiprazole (ABILIFY) 2 MG tablet Take 1 tablet by mouth Daily. 90 tablet 1   • tiZANidine (ZANAFLEX) 4 MG tablet Take 1 tablet by mouth Every Night. 90 tablet 1   • DULoxetine (CYMBALTA) 30 MG capsule Take 1 capsule by mouth Daily. 90 capsule 3   • escitalopram (Lexapro) 20 MG tablet Take 1 tablet by mouth Daily. 90 tablet 3   • hydroCHLOROthiazide (MICROZIDE) 12.5 MG capsule TAKE 1 CAPSULE DAILY 90 capsule 3   • meloxicam (MOBIC) 15 MG tablet Take 1 tablet by mouth Daily. 90 tablet 3   • omeprazole (priLOSEC) 40 MG capsule Take 1 capsule by mouth Daily. 90 capsule 3   • traZODone (DESYREL) 50 MG tablet Take 1 tablet by mouth Every Night. 90 tablet 1     No facility-administered medications prior to visit.       No opioid medication identified on active medication list. I have reviewed chart for other potential  high risk medication/s and harmful drug interactions in the elderly.          Aspirin is not on active medication list.  Aspirin use is not indicated based on review of current medical condition/s. Risk of harm outweighs potential benefits.  .    Patient Active Problem List   Diagnosis   • Absolute anemia   • Anxiety   • Arthritis   • Depression   • Benign essential hypertension   • Fibromyalgia   • Restless leg syndrome   • Esophageal reflux   •  "Insomnia   • Spinal stenosis of lumbar region   • Polyarthropathy or polyarthritis     Advance Care Planning  Advance Directive is on file.  ACP discussion was held with the patient during this visit. Patient has an advance directive in EMR which is still valid.     Review of Systems   Constitutional: Positive for fatigue.   Cardiovascular: Negative.  Negative for chest pain, palpitations and leg swelling.   Gastrointestinal: Negative for abdominal pain and constipation.        Has \"bowel leakage\" with istem implanted   Genitourinary: Negative.    Musculoskeletal: Positive for myalgias.        H/o fibromyalgia   Neurological: Negative for dizziness and weakness.   Psychiatric/Behavioral: Positive for dysphoric mood and sleep disturbance.        Objective    Vitals:    01/27/22 1042   Resp: 16   Height: 165.1 cm (65\")     BMI Readings from Last 1 Encounters:   01/27/22 24.95 kg/m²   BMI is within normal parameters. No follow-up required.  Body mass index is 24.95 kg/m².  BMI has not been calculated during today's encounter.     Does the patient have evidence of cognitive impairment? No    Physical Exam  Vitals reviewed.   Constitutional:       Appearance: Normal appearance. She is well-developed and well-groomed.   HENT:      Head: Normocephalic.      Right Ear: Tympanic membrane normal.      Left Ear: Tympanic membrane normal.      Nose: Nose normal.      Mouth/Throat:      Mouth: Mucous membranes are moist.      Pharynx: Oropharynx is clear.   Eyes:      Extraocular Movements: Extraocular movements intact.   Neck:      Thyroid: No thyromegaly.      Vascular: No carotid bruit.   Cardiovascular:      Rate and Rhythm: Normal rate and regular rhythm.      Pulses: Normal pulses.      Heart sounds: Normal heart sounds.   Pulmonary:      Effort: Pulmonary effort is normal.      Breath sounds: Normal breath sounds.   Abdominal:      General: Bowel sounds are normal.      Palpations: Abdomen is soft.      Tenderness: There " is no right CVA tenderness or left CVA tenderness.   Musculoskeletal:         General: Normal range of motion.      Cervical back: Normal range of motion and neck supple.      Right lower leg: No edema.      Left lower leg: No edema.   Lymphadenopathy:      Cervical: No cervical adenopathy.   Skin:     General: Skin is warm.   Neurological:      Mental Status: She is alert and oriented to person, place, and time.   Psychiatric:         Mood and Affect: Mood normal.                 HEALTH RISK ASSESSMENT    Smoking Status:  Social History     Tobacco Use   Smoking Status Never Smoker   Smokeless Tobacco Never Used     Alcohol Consumption:  Social History     Substance and Sexual Activity   Alcohol Use No     Fall Risk Screen:    STEADI Fall Risk Assessment was completed, and patient is at LOW risk for falls.Assessment completed on:1/27/2022    Depression Screening:  PHQ-2/PHQ-9 Depression Screening 1/27/2022   Little interest or pleasure in doing things 0   Feeling down, depressed, or hopeless 0   Trouble falling or staying asleep, or sleeping too much -   Feeling tired or having little energy -   Poor appetite or overeating -   Feeling bad about yourself - or that you are a failure or have let yourself or your family down -   Trouble concentrating on things, such as reading the newspaper or watching television -   Moving or speaking so slowly that other people could have noticed. Or the opposite - being so fidgety or restless that you have been moving around a lot more than usual -   Thoughts that you would be better off dead, or of hurting yourself in some way -   Total Score 0   If you checked off any problems, how difficult have these problems made it for you to do your work, take care of things at home, or get along with other people? -       Health Habits and Functional and Cognitive Screening:  Functional & Cognitive Status 1/27/2022   Do you have difficulty preparing food and eating? No   Do you have  difficulty bathing yourself, getting dressed or grooming yourself? No   Do you have difficulty using the toilet? No   Do you have difficulty moving around from place to place? No   Do you have trouble with steps or getting out of a bed or a chair? No   Current Diet Well Balanced Diet   Dental Exam Up to date   Eye Exam Other        Eye Exam Comment due now 1/2022   Exercise (times per week) 3 times per week   Current Exercises Include Walking;House Cleaning;Yard Work;Other;Gardening   Current Exercise Activities Include -   Do you need help using the phone?  No   Are you deaf or do you have serious difficulty hearing?  No   Do you need help with transportation? No   Do you need help shopping? No   Do you need help preparing meals?  No   Do you need help with housework?  No   Do you need help with laundry? No   Do you need help taking your medications? No   Do you need help managing money? No   Do you ever drive or ride in a car without wearing a seat belt? No   Have you felt unusual stress, anger or loneliness in the last month? No   Who do you live with? Spouse   If you need help, do you have trouble finding someone available to you? No   Have you been bothered in the last four weeks by sexual problems? No   Do you have difficulty concentrating, remembering or making decisions? No       Age-appropriate Screening Schedule:  Refer to the list below for future screening recommendations based on patient's age, sex and/or medical conditions. Orders for these recommended tests are listed in the plan section. The patient has been provided with a written plan.    Health Maintenance   Topic Date Due   • ZOSTER VACCINE (1 of 2) Never done   • DXA SCAN  09/25/2021   • LIPID PANEL  11/17/2021   • TDAP/TD VACCINES (1 - Tdap) 04/26/2022 (Originally 12/22/1964)   • INFLUENZA VACCINE  Completed              Assessment/Plan   CMS Preventative Services Quick Reference  Risk Factors Identified During Encounter  Cardiovascular  Disease  Depression/Dysphoria  The above risks/problems have been discussed with the patient.  Follow up actions/plans if indicated are seen below in the Assessment/Plan Section.  Pertinent information has been shared with the patient in the After Visit Summary.    Diagnoses and all orders for this visit:    1. Medicare annual wellness visit, subsequent (Primary)    2. Annual physical exam  -     Comprehensive metabolic panel; Future  -     CBC No Differential; Future  -     Lipid panel; Future  -     Urinalysis With Culture If Indicated - Urine, Clean Catch; Future    3. Encounter for screening mammogram for malignant neoplasm of breast  -     Mammo Screening Digital Tomosynthesis Bilateral With CAD; Future    4. Fibromyalgia  -     DULoxetine (CYMBALTA) 30 MG capsule; Take 1 capsule by mouth Daily.  Dispense: 90 capsule; Refill: 3  -     meloxicam (MOBIC) 15 MG tablet; Take 1 tablet by mouth Daily.  Dispense: 90 tablet; Refill: 3    5. Benign essential hypertension  -     hydroCHLOROthiazide (MICROZIDE) 12.5 MG capsule; Take 1 capsule by mouth Daily.  Dispense: 90 capsule; Refill: 3    6. Recurrent major depressive disorder, in remission (HCC)  -     escitalopram (Lexapro) 20 MG tablet; Take 1 tablet by mouth Daily.  Dispense: 90 tablet; Refill: 3    7. Gastroesophageal reflux disease without esophagitis  -     omeprazole (priLOSEC) 40 MG capsule; Take 1 capsule by mouth Daily.  Dispense: 90 capsule; Refill: 3    8. Insomnia, unspecified type    will try 1/2 tablet (25mg) of the trazodone and decrease tizanidine from 8mg to 4mg    Follow Up:   Return in about 1 year (around 1/27/2023) for Annual physical, Medicare Wellness.     An After Visit Summary and PPPS were made available to the patient.

## 2022-01-28 RX ORDER — NITROFURANTOIN 25; 75 MG/1; MG/1
100 CAPSULE ORAL 2 TIMES DAILY
Qty: 10 CAPSULE | Refills: 0 | Status: SHIPPED | OUTPATIENT
Start: 2022-01-28

## 2022-01-28 NOTE — ADDENDUM NOTE
Addended by: PABLO WARREN on: 1/28/2022 12:53 PM     Modules accepted: Orders     Areas of weakness and Tear/RD-See #1, will re-enforce with laser during surgical repair of RD.

## 2022-02-01 ENCOUNTER — HOSPITAL ENCOUNTER (OUTPATIENT)
Dept: MAMMOGRAPHY | Facility: HOSPITAL | Age: 77
Discharge: HOME | End: 2022-02-01
Admitting: NURSE PRACTITIONER

## 2022-02-01 DIAGNOSIS — Z12.31 ENCOUNTER FOR SCREENING MAMMOGRAM FOR MALIGNANT NEOPLASM OF BREAST: ICD-10-CM

## 2022-02-01 PROCEDURE — 77067 SCR MAMMO BI INCL CAD: CPT

## 2022-02-01 PROCEDURE — 77063 BREAST TOMOSYNTHESIS BI: CPT

## 2022-02-07 RX ORDER — ARIPIPRAZOLE 2 MG/1
TABLET ORAL
Qty: 90 TABLET | Refills: 1 | Status: SHIPPED | OUTPATIENT
Start: 2022-02-07 | End: 2022-08-02

## 2022-03-10 RX ORDER — TIZANIDINE 4 MG/1
4 TABLET ORAL NIGHTLY
Qty: 90 TABLET | Refills: 1 | Status: SHIPPED | OUTPATIENT
Start: 2022-03-10 | End: 2022-09-06

## 2022-03-10 NOTE — TELEPHONE ENCOUNTER
Caller: [facility] - [place], [place] - [address] AT [address]AL TO [facility] - [phone] [facility] [phone]  [phone]    Relationship: Pharmacy  Requested Prescriptions:   Requested Prescriptions     Pending Prescriptions Disp Refills   • tiZANidine (ZANAFLEX) 4 MG tablet 90 tablet 1     Sig: Take 1 tablet by mouth Every Night.        Pharmacy where request should be sent: [facility] - [place], [place] - [address] AT [address] TO [facility] - [phone] [facility] [phone]       Angelica Valera, RegSched Rep   03/10/22 16:40 EST     REF # [id number]

## 2022-04-08 PROBLEM — Z80.0 FAMILY HISTORY OF COLON CANCER: Status: ACTIVE | Noted: 2017-04-11

## 2022-06-06 ENCOUNTER — ON CAMPUS - OUTPATIENT (AMBULATORY)
Dept: URBAN - METROPOLITAN AREA HOSPITAL 2 | Facility: HOSPITAL | Age: 77
End: 2022-06-06
Payer: MEDICARE

## 2022-06-06 VITALS
DIASTOLIC BLOOD PRESSURE: 61 MMHG | TEMPERATURE: 96.8 F | OXYGEN SATURATION: 100 % | DIASTOLIC BLOOD PRESSURE: 57 MMHG | HEART RATE: 82 BPM | SYSTOLIC BLOOD PRESSURE: 123 MMHG | OXYGEN SATURATION: 96 % | OXYGEN SATURATION: 98 % | DIASTOLIC BLOOD PRESSURE: 53 MMHG | HEART RATE: 71 BPM | RESPIRATION RATE: 16 BRPM | HEART RATE: 89 BPM | RESPIRATION RATE: 18 BRPM | SYSTOLIC BLOOD PRESSURE: 122 MMHG | DIASTOLIC BLOOD PRESSURE: 78 MMHG | SYSTOLIC BLOOD PRESSURE: 147 MMHG | OXYGEN SATURATION: 99 % | SYSTOLIC BLOOD PRESSURE: 133 MMHG | SYSTOLIC BLOOD PRESSURE: 128 MMHG | HEART RATE: 75 BPM | WEIGHT: 145 LBS | DIASTOLIC BLOOD PRESSURE: 56 MMHG | HEART RATE: 68 BPM | HEIGHT: 63 IN | DIASTOLIC BLOOD PRESSURE: 73 MMHG | HEART RATE: 72 BPM

## 2022-06-06 DIAGNOSIS — Z80.0 FAMILY HISTORY OF MALIGNANT NEOPLASM OF DIGESTIVE ORGANS: ICD-10-CM

## 2022-06-06 PROCEDURE — G0105 COLORECTAL SCRN; HI RISK IND: HCPCS | Performed by: INTERNAL MEDICINE

## 2022-08-02 RX ORDER — ARIPIPRAZOLE 2 MG/1
TABLET ORAL
Qty: 90 TABLET | Refills: 1 | Status: SHIPPED | OUTPATIENT
Start: 2022-08-02 | End: 2023-01-23

## 2022-09-06 RX ORDER — TIZANIDINE 4 MG/1
TABLET ORAL
Qty: 90 TABLET | Refills: 1 | Status: SHIPPED | OUTPATIENT
Start: 2022-09-06 | End: 2023-02-27

## 2023-01-09 DIAGNOSIS — K21.9 GASTROESOPHAGEAL REFLUX DISEASE WITHOUT ESOPHAGITIS: ICD-10-CM

## 2023-01-09 DIAGNOSIS — I10 BENIGN ESSENTIAL HYPERTENSION: ICD-10-CM

## 2023-01-09 DIAGNOSIS — M79.7 FIBROMYALGIA: ICD-10-CM

## 2023-01-09 RX ORDER — OMEPRAZOLE 40 MG/1
CAPSULE, DELAYED RELEASE ORAL
Qty: 90 CAPSULE | Refills: 1 | Status: SHIPPED | OUTPATIENT
Start: 2023-01-09

## 2023-01-09 RX ORDER — MELOXICAM 15 MG/1
TABLET ORAL
Qty: 90 TABLET | Refills: 1 | Status: SHIPPED | OUTPATIENT
Start: 2023-01-09

## 2023-01-09 RX ORDER — DULOXETIN HYDROCHLORIDE 30 MG/1
CAPSULE, DELAYED RELEASE ORAL
Qty: 90 CAPSULE | Refills: 1 | Status: SHIPPED | OUTPATIENT
Start: 2023-01-09

## 2023-01-09 RX ORDER — HYDROCHLOROTHIAZIDE 12.5 MG/1
CAPSULE, GELATIN COATED ORAL
Qty: 90 CAPSULE | Refills: 1 | Status: SHIPPED | OUTPATIENT
Start: 2023-01-09

## 2023-01-23 RX ORDER — ARIPIPRAZOLE 2 MG/1
TABLET ORAL
Qty: 90 TABLET | Refills: 1 | Status: SHIPPED | OUTPATIENT
Start: 2023-01-23

## 2023-02-22 ENCOUNTER — TRANSCRIBE ORDERS (OUTPATIENT)
Dept: ADMINISTRATIVE | Facility: HOSPITAL | Age: 78
End: 2023-02-22
Payer: MEDICARE

## 2023-02-22 DIAGNOSIS — Z12.31 VISIT FOR SCREENING MAMMOGRAM: Primary | ICD-10-CM

## 2023-02-27 RX ORDER — TIZANIDINE 4 MG/1
TABLET ORAL
Qty: 90 TABLET | Refills: 1 | Status: SHIPPED | OUTPATIENT
Start: 2023-02-27

## 2023-02-28 ENCOUNTER — HOSPITAL ENCOUNTER (OUTPATIENT)
Dept: MAMMOGRAPHY | Facility: HOSPITAL | Age: 78
Discharge: HOME | End: 2023-02-28
Admitting: NURSE PRACTITIONER
Payer: MEDICARE

## 2023-02-28 DIAGNOSIS — Z12.31 VISIT FOR SCREENING MAMMOGRAM: ICD-10-CM

## 2023-02-28 PROCEDURE — 77067 SCR MAMMO BI INCL CAD: CPT

## 2023-02-28 PROCEDURE — 77063 BREAST TOMOSYNTHESIS BI: CPT

## 2023-03-01 ENCOUNTER — TELEPHONE (OUTPATIENT)
Dept: FAMILY MEDICINE CLINIC | Facility: CLINIC | Age: 78
End: 2023-03-01
Payer: MEDICARE

## 2023-03-01 NOTE — TELEPHONE ENCOUNTER
----- Message from NI Cano sent at 3/1/2023  7:57 AM EST -----  Please call Ms. Hi and tell her that her screening mammogram is negative, the recommendation is  to continue with routine screenings.

## 2023-06-22 NOTE — TELEPHONE ENCOUNTER
Gave message to patient at 4:07pm.  She said she can wait until Thursday to be seen because her symptoms are not that severe.   Goal Outcome Evaluation:    Shift: 6/21/23 [id number]  Summary: CAD post CABF, ESRD, bilateral kidney transplant, htn, splenectomy.   Primary Diagnosis: Elective right hip revision with Dr. Mcdonough   Orientation: A/Ox4  Mobility: Ax1 GB/W  Diet: Advance Regular diet   Pain: Denies pain   Bowel/Bladder:Continent of bowel and bladder. No BM this shift.   Drains:LPIV SL  Consults: PT,  Discharge: Pending.

## 2023-08-02 ENCOUNTER — OFFICE VISIT (OUTPATIENT)
Dept: FAMILY MEDICINE CLINIC | Facility: CLINIC | Age: 78
End: 2023-08-02
Payer: MEDICARE

## 2023-08-02 VITALS
BODY MASS INDEX: 21.66 KG/M2 | SYSTOLIC BLOOD PRESSURE: 120 MMHG | WEIGHT: 130 LBS | HEIGHT: 65 IN | HEART RATE: 85 BPM | DIASTOLIC BLOOD PRESSURE: 60 MMHG | OXYGEN SATURATION: 98 %

## 2023-08-02 DIAGNOSIS — R42 LIGHTHEADEDNESS: ICD-10-CM

## 2023-08-02 DIAGNOSIS — F33.40 RECURRENT MAJOR DEPRESSIVE DISORDER, IN REMISSION: ICD-10-CM

## 2023-08-02 DIAGNOSIS — F41.9 ANXIETY: Primary | ICD-10-CM

## 2023-08-02 PROCEDURE — 99214 OFFICE O/P EST MOD 30 MIN: CPT | Performed by: STUDENT IN AN ORGANIZED HEALTH CARE EDUCATION/TRAINING PROGRAM

## 2023-08-02 PROCEDURE — 3078F DIAST BP <80 MM HG: CPT | Performed by: STUDENT IN AN ORGANIZED HEALTH CARE EDUCATION/TRAINING PROGRAM

## 2023-08-02 PROCEDURE — 1159F MED LIST DOCD IN RCRD: CPT | Performed by: STUDENT IN AN ORGANIZED HEALTH CARE EDUCATION/TRAINING PROGRAM

## 2023-08-02 PROCEDURE — 1160F RVW MEDS BY RX/DR IN RCRD: CPT | Performed by: STUDENT IN AN ORGANIZED HEALTH CARE EDUCATION/TRAINING PROGRAM

## 2023-08-02 PROCEDURE — 3074F SYST BP LT 130 MM HG: CPT | Performed by: STUDENT IN AN ORGANIZED HEALTH CARE EDUCATION/TRAINING PROGRAM

## 2023-08-02 RX ORDER — ARIPIPRAZOLE 5 MG/1
5 TABLET ORAL DAILY
Qty: 90 TABLET | Refills: 1 | Status: SHIPPED | OUTPATIENT
Start: 2023-08-02

## 2023-08-02 RX ORDER — HYDROXYZINE HYDROCHLORIDE 25 MG/1
25 TABLET, FILM COATED ORAL 2 TIMES DAILY PRN
Qty: 90 TABLET | Refills: 1 | Status: SHIPPED | OUTPATIENT
Start: 2023-08-02

## 2023-11-17 ENCOUNTER — LAB (OUTPATIENT)
Dept: LAB | Facility: HOSPITAL | Age: 78
End: 2023-11-17
Payer: MEDICARE

## 2023-11-17 ENCOUNTER — HOSPITAL ENCOUNTER (OUTPATIENT)
Dept: CARDIOLOGY | Facility: HOSPITAL | Age: 78
Discharge: HOME | End: 2023-11-17
Payer: MEDICARE

## 2023-11-17 ENCOUNTER — TRANSCRIBE ORDERS (OUTPATIENT)
Dept: ADMINISTRATIVE | Facility: HOSPITAL | Age: 78
End: 2023-11-17
Payer: MEDICARE

## 2023-11-17 DIAGNOSIS — R15.9 ENCOPRESIS WITHOUT CONSTIPATION AND OVERFLOW INCONTINENCE: ICD-10-CM

## 2023-11-17 DIAGNOSIS — R15.9 ENCOPRESIS WITHOUT CONSTIPATION AND OVERFLOW INCONTINENCE: Primary | ICD-10-CM

## 2023-11-17 LAB
ANION GAP SERPL CALCULATED.3IONS-SCNC: 10 MMOL/L (ref 5–15)
BASOPHILS # BLD AUTO: 0.02 10*3/MM3 (ref 0–0.2)
BASOPHILS NFR BLD AUTO: 0.4 % (ref 0–1.5)
BUN SERPL-MCNC: 24 MG/DL (ref 8–23)
BUN/CREAT SERPL: 20.5 (ref 7–25)
CALCIUM SPEC-SCNC: 9.9 MG/DL (ref 8.6–10.5)
CHLORIDE SERPL-SCNC: 102 MMOL/L (ref 98–107)
CO2 SERPL-SCNC: 27 MMOL/L (ref 22–29)
CREAT SERPL-MCNC: 1.17 MG/DL (ref 0.57–1)
DEPRECATED RDW RBC AUTO: 39.3 FL (ref 37–54)
EGFRCR SERPLBLD CKD-EPI 2021: 48.5 ML/MIN/1.73
EOSINOPHIL # BLD AUTO: 0.07 10*3/MM3 (ref 0–0.4)
EOSINOPHIL NFR BLD AUTO: 1.4 % (ref 0.3–6.2)
ERYTHROCYTE [DISTWIDTH] IN BLOOD BY AUTOMATED COUNT: 12 % (ref 12.3–15.4)
GLUCOSE SERPL-MCNC: 74 MG/DL (ref 65–99)
HCT VFR BLD AUTO: 38 % (ref 34–46.6)
HGB BLD-MCNC: 13.1 G/DL (ref 12–15.9)
IMM GRANULOCYTES # BLD AUTO: 0.01 10*3/MM3 (ref 0–0.05)
IMM GRANULOCYTES NFR BLD AUTO: 0.2 % (ref 0–0.5)
LYMPHOCYTES # BLD AUTO: 1.77 10*3/MM3 (ref 0.7–3.1)
LYMPHOCYTES NFR BLD AUTO: 34.8 % (ref 19.6–45.3)
MCH RBC QN AUTO: 31.3 PG (ref 26.6–33)
MCHC RBC AUTO-ENTMCNC: 34.5 G/DL (ref 31.5–35.7)
MCV RBC AUTO: 90.7 FL (ref 79–97)
MONOCYTES # BLD AUTO: 0.38 10*3/MM3 (ref 0.1–0.9)
MONOCYTES NFR BLD AUTO: 7.5 % (ref 5–12)
NEUTROPHILS NFR BLD AUTO: 2.83 10*3/MM3 (ref 1.7–7)
NEUTROPHILS NFR BLD AUTO: 55.7 % (ref 42.7–76)
NRBC BLD AUTO-RTO: 0 /100 WBC (ref 0–0.2)
PLATELET # BLD AUTO: 192 10*3/MM3 (ref 140–450)
PMV BLD AUTO: 10.9 FL (ref 6–12)
POTASSIUM SERPL-SCNC: 4.3 MMOL/L (ref 3.5–5.2)
QT INTERVAL: 388 MS
QTC INTERVAL: 422 MS
RBC # BLD AUTO: 4.19 10*6/MM3 (ref 3.77–5.28)
SODIUM SERPL-SCNC: 139 MMOL/L (ref 136–145)
WBC NRBC COR # BLD AUTO: 5.08 10*3/MM3 (ref 3.4–10.8)

## 2023-11-17 PROCEDURE — 85025 COMPLETE CBC W/AUTO DIFF WBC: CPT

## 2023-11-17 PROCEDURE — 36415 COLL VENOUS BLD VENIPUNCTURE: CPT

## 2023-11-17 PROCEDURE — 80048 BASIC METABOLIC PNL TOTAL CA: CPT

## 2023-11-17 PROCEDURE — 93005 ELECTROCARDIOGRAM TRACING: CPT | Performed by: UROLOGY

## 2023-12-28 DIAGNOSIS — I10 BENIGN ESSENTIAL HYPERTENSION: ICD-10-CM

## 2023-12-28 DIAGNOSIS — K21.9 GASTROESOPHAGEAL REFLUX DISEASE WITHOUT ESOPHAGITIS: ICD-10-CM

## 2023-12-28 DIAGNOSIS — M79.7 FIBROMYALGIA: ICD-10-CM

## 2023-12-28 RX ORDER — OMEPRAZOLE 40 MG/1
CAPSULE, DELAYED RELEASE ORAL
Qty: 90 CAPSULE | Refills: 1 | Status: SHIPPED | OUTPATIENT
Start: 2023-12-28

## 2023-12-28 RX ORDER — HYDROCHLOROTHIAZIDE 12.5 MG/1
CAPSULE, GELATIN COATED ORAL
Qty: 90 CAPSULE | Refills: 1 | Status: SHIPPED | OUTPATIENT
Start: 2023-12-28

## 2023-12-28 RX ORDER — DULOXETIN HYDROCHLORIDE 30 MG/1
CAPSULE, DELAYED RELEASE ORAL
Qty: 90 CAPSULE | Refills: 1 | Status: SHIPPED | OUTPATIENT
Start: 2023-12-28

## 2024-01-16 DIAGNOSIS — F41.9 ANXIETY: ICD-10-CM

## 2024-01-16 DIAGNOSIS — F33.40 RECURRENT MAJOR DEPRESSIVE DISORDER, IN REMISSION: ICD-10-CM

## 2024-01-16 RX ORDER — ARIPIPRAZOLE 5 MG/1
5 TABLET ORAL DAILY
Qty: 90 TABLET | Refills: 1 | Status: SHIPPED | OUTPATIENT
Start: 2024-01-16

## 2024-02-01 ENCOUNTER — TELEPHONE (OUTPATIENT)
Dept: FAMILY MEDICINE CLINIC | Facility: CLINIC | Age: 79
End: 2024-02-01
Payer: MEDICARE

## 2024-02-01 DIAGNOSIS — Z00.00 MEDICARE ANNUAL WELLNESS VISIT, SUBSEQUENT: Primary | ICD-10-CM

## 2024-02-01 NOTE — TELEPHONE ENCOUNTER
Caller: [facility]    Relationship: Self    Best call back number:     [phone]         What was the call regarding: PLACE LAB ORDERS AND CALL PATIENT FOR APPOINTMENT BEFORE APPOINTMENT IN APRIL

## 2024-02-05 ENCOUNTER — OFFICE VISIT (OUTPATIENT)
Dept: FAMILY MEDICINE CLINIC | Facility: CLINIC | Age: 79
End: 2024-02-05
Payer: MEDICARE

## 2024-02-05 VITALS
HEIGHT: 65 IN | RESPIRATION RATE: 18 BRPM | BODY MASS INDEX: 22.89 KG/M2 | WEIGHT: 137.4 LBS | DIASTOLIC BLOOD PRESSURE: 82 MMHG | HEART RATE: 60 BPM | OXYGEN SATURATION: 99 % | SYSTOLIC BLOOD PRESSURE: 131 MMHG

## 2024-02-05 DIAGNOSIS — G62.9 PERIPHERAL POLYNEUROPATHY: ICD-10-CM

## 2024-02-05 DIAGNOSIS — G25.81 RESTLESS LEGS SYNDROME: Primary | ICD-10-CM

## 2024-02-05 PROCEDURE — 99214 OFFICE O/P EST MOD 30 MIN: CPT | Performed by: STUDENT IN AN ORGANIZED HEALTH CARE EDUCATION/TRAINING PROGRAM

## 2024-02-05 PROCEDURE — 3079F DIAST BP 80-89 MM HG: CPT | Performed by: STUDENT IN AN ORGANIZED HEALTH CARE EDUCATION/TRAINING PROGRAM

## 2024-02-05 PROCEDURE — 3075F SYST BP GE 130 - 139MM HG: CPT | Performed by: STUDENT IN AN ORGANIZED HEALTH CARE EDUCATION/TRAINING PROGRAM

## 2024-02-05 RX ORDER — GABAPENTIN 300 MG/1
300 CAPSULE ORAL NIGHTLY
Qty: 30 CAPSULE | Refills: 2 | Status: SHIPPED | OUTPATIENT
Start: 2024-02-05

## 2024-02-05 NOTE — PROGRESS NOTES
"Chief Complaint  Chief Complaint   Patient presents with    Restless Legs Syndrome     Subjective        Chel Hi is a 78 y.o. female who presents to [facility].    History of Present Illness  Her legs at night feel like cement and they are uncomfortable.  She has to constantly move them to get comfortable.  She is having a lot of trouble sleeping because of this.    This has been going on for several months.  She has never had this before.    She has a lot of anxiety but no other life changes.    She has started taking a magnesium supplement.    She does not drink enough water.  She is not a fruit eater.    She also feels the bottom of her feet hurting with a \"burning\" pain at night.      Objective   /82   Pulse 60   Resp 18   Ht 165.1 cm (65\")   Wt 62.3 kg (137 lb 6.4 oz)   SpO2 99%   BMI 22.86 kg/m²     Estimated body mass index is 22.86 kg/m² as calculated from the following:    Height as of this encounter: 165.1 cm (65\").    Weight as of this encounter: 62.3 kg (137 lb 6.4 oz).     Physical Exam   GEN: In no acute distress, non toxic appearing  HEENT: Pupils equal and reactive to light, sclera clear. Mucous membranes moist.  NEURO: AAO to person, place, and time. CN 2-12 intact grossly.  PSYCH: Affect normal, insight fair     PHQ-2 Depression Screening  Little interest or pleasure in doing things? 3-->nearly every day   Feeling down, depressed, or hopeless? 3-->nearly every day   PHQ-2 Total Score 12      Result Review :              Assessment and Plan     Diagnoses and all orders for this visit:    1. Restless legs syndrome (Primary)  2. Peripheral polyneuropathy  This is a new problem.  History consistent with likely restless legs and neuropathic pains at night.  Continue magnesium supplement.  Recommend increased water intake.  Recommend increased potassium intake.  Trial gabapentin 300 mg nightly for the RLS and neuropathy.  Discussed common side " effects.  F/u 2 months at appt in August.    -     gabapentin (NEURONTIN) 300 MG capsule; Take 1 capsule by mouth Every Night.  Dispense: 30 capsule; Refill: 2       Follow Up   Has appt scheduled in April

## 2024-03-05 ENCOUNTER — TRANSCRIBE ORDERS (OUTPATIENT)
Dept: ADMINISTRATIVE | Facility: HOSPITAL | Age: 79
End: 2024-03-05
Payer: MEDICARE

## 2024-03-05 DIAGNOSIS — Z12.31 SCREENING MAMMOGRAM FOR BREAST CANCER: Primary | ICD-10-CM

## 2024-03-19 ENCOUNTER — HOSPITAL ENCOUNTER (OUTPATIENT)
Dept: MAMMOGRAPHY | Facility: HOSPITAL | Age: 79
Discharge: HOME | End: 2024-03-19
Admitting: STUDENT IN AN ORGANIZED HEALTH CARE EDUCATION/TRAINING PROGRAM
Payer: MEDICARE

## 2024-03-19 DIAGNOSIS — Z12.31 SCREENING MAMMOGRAM FOR BREAST CANCER: ICD-10-CM

## 2024-03-19 PROCEDURE — 77067 SCR MAMMO BI INCL CAD: CPT

## 2024-03-19 PROCEDURE — 77063 BREAST TOMOSYNTHESIS BI: CPT

## 2024-04-09 ENCOUNTER — LAB (OUTPATIENT)
Dept: FAMILY MEDICINE CLINIC | Facility: CLINIC | Age: 79
End: 2024-04-09
Payer: MEDICARE

## 2024-04-09 DIAGNOSIS — Z00.00 MEDICARE ANNUAL WELLNESS VISIT, SUBSEQUENT: ICD-10-CM

## 2024-04-09 LAB
ALBUMIN SERPL-MCNC: 4.3 G/DL (ref 3.5–5.2)
ALBUMIN/GLOB SERPL: 1.5 G/DL
ALP SERPL-CCNC: 76 U/L (ref 39–117)
ALT SERPL W P-5'-P-CCNC: 26 U/L (ref 1–33)
ANION GAP SERPL CALCULATED.3IONS-SCNC: 12 MMOL/L (ref 5–15)
AST SERPL-CCNC: 26 U/L (ref 1–32)
BILIRUB SERPL-MCNC: 0.4 MG/DL (ref 0–1.2)
BUN SERPL-MCNC: 30 MG/DL (ref 8–23)
BUN/CREAT SERPL: 23.6 (ref 7–25)
CALCIUM SPEC-SCNC: 9.3 MG/DL (ref 8.6–10.5)
CHLORIDE SERPL-SCNC: 106 MMOL/L (ref 98–107)
CHOLEST SERPL-MCNC: 244 MG/DL (ref 0–200)
CO2 SERPL-SCNC: 23 MMOL/L (ref 22–29)
CREAT SERPL-MCNC: 1.27 MG/DL (ref 0.57–1)
DEPRECATED RDW RBC AUTO: 40.2 FL (ref 37–54)
EGFRCR SERPLBLD CKD-EPI 2021: 43.6 ML/MIN/1.73
ERYTHROCYTE [DISTWIDTH] IN BLOOD BY AUTOMATED COUNT: 12.5 % (ref 12.3–15.4)
GLOBULIN UR ELPH-MCNC: 2.9 GM/DL
GLUCOSE SERPL-MCNC: 84 MG/DL (ref 65–99)
HBA1C MFR BLD: 5.5 % (ref 4.8–5.6)
HCT VFR BLD AUTO: 37.4 % (ref 34–46.6)
HDLC SERPL-MCNC: 96 MG/DL (ref 40–60)
HGB BLD-MCNC: 12.7 G/DL (ref 12–15.9)
LDLC SERPL CALC-MCNC: 126 MG/DL (ref 0–100)
LDLC/HDLC SERPL: 1.27 {RATIO}
MCH RBC QN AUTO: 30.1 PG (ref 26.6–33)
MCHC RBC AUTO-ENTMCNC: 34 G/DL (ref 31.5–35.7)
MCV RBC AUTO: 88.6 FL (ref 79–97)
PLATELET # BLD AUTO: 205 10*3/MM3 (ref 140–450)
PMV BLD AUTO: 9.7 FL (ref 6–12)
POTASSIUM SERPL-SCNC: 4.1 MMOL/L (ref 3.5–5.2)
PROT SERPL-MCNC: 7.2 G/DL (ref 6–8.5)
RBC # BLD AUTO: 4.22 10*6/MM3 (ref 3.77–5.28)
SODIUM SERPL-SCNC: 141 MMOL/L (ref 136–145)
TRIGL SERPL-MCNC: 129 MG/DL (ref 0–150)
TSH SERPL DL<=0.05 MIU/L-ACNC: 1.91 UIU/ML (ref 0.27–4.2)
VLDLC SERPL-MCNC: 22 MG/DL (ref 5–40)
WBC NRBC COR # BLD AUTO: 5.22 10*3/MM3 (ref 3.4–10.8)

## 2024-04-09 PROCEDURE — 85027 COMPLETE CBC AUTOMATED: CPT | Performed by: STUDENT IN AN ORGANIZED HEALTH CARE EDUCATION/TRAINING PROGRAM

## 2024-04-09 PROCEDURE — 80053 COMPREHEN METABOLIC PANEL: CPT | Performed by: STUDENT IN AN ORGANIZED HEALTH CARE EDUCATION/TRAINING PROGRAM

## 2024-04-09 PROCEDURE — 83036 HEMOGLOBIN GLYCOSYLATED A1C: CPT | Performed by: STUDENT IN AN ORGANIZED HEALTH CARE EDUCATION/TRAINING PROGRAM

## 2024-04-09 PROCEDURE — 84443 ASSAY THYROID STIM HORMONE: CPT | Performed by: STUDENT IN AN ORGANIZED HEALTH CARE EDUCATION/TRAINING PROGRAM

## 2024-04-09 PROCEDURE — 80061 LIPID PANEL: CPT | Performed by: STUDENT IN AN ORGANIZED HEALTH CARE EDUCATION/TRAINING PROGRAM

## 2024-04-09 PROCEDURE — 36415 COLL VENOUS BLD VENIPUNCTURE: CPT

## 2024-04-16 ENCOUNTER — OFFICE VISIT (OUTPATIENT)
Dept: FAMILY MEDICINE CLINIC | Facility: CLINIC | Age: 79
End: 2024-04-16
Payer: MEDICARE

## 2024-04-16 VITALS
SYSTOLIC BLOOD PRESSURE: 126 MMHG | DIASTOLIC BLOOD PRESSURE: 77 MMHG | HEIGHT: 65 IN | BODY MASS INDEX: 23.56 KG/M2 | OXYGEN SATURATION: 98 % | HEART RATE: 78 BPM | WEIGHT: 141.4 LBS | RESPIRATION RATE: 18 BRPM

## 2024-04-16 DIAGNOSIS — I10 BENIGN ESSENTIAL HYPERTENSION: ICD-10-CM

## 2024-04-16 DIAGNOSIS — F33.40 RECURRENT MAJOR DEPRESSIVE DISORDER, IN REMISSION: ICD-10-CM

## 2024-04-16 DIAGNOSIS — G25.81 RESTLESS LEG SYNDROME: ICD-10-CM

## 2024-04-16 DIAGNOSIS — F41.9 ANXIETY: ICD-10-CM

## 2024-04-16 DIAGNOSIS — Z78.0 ASYMPTOMATIC MENOPAUSAL STATE: ICD-10-CM

## 2024-04-16 DIAGNOSIS — Z00.00 MEDICARE ANNUAL WELLNESS VISIT, SUBSEQUENT: Primary | ICD-10-CM

## 2024-04-16 PROCEDURE — 3074F SYST BP LT 130 MM HG: CPT | Performed by: STUDENT IN AN ORGANIZED HEALTH CARE EDUCATION/TRAINING PROGRAM

## 2024-04-16 PROCEDURE — 1170F FXNL STATUS ASSESSED: CPT | Performed by: STUDENT IN AN ORGANIZED HEALTH CARE EDUCATION/TRAINING PROGRAM

## 2024-04-16 PROCEDURE — 3078F DIAST BP <80 MM HG: CPT | Performed by: STUDENT IN AN ORGANIZED HEALTH CARE EDUCATION/TRAINING PROGRAM

## 2024-04-16 PROCEDURE — 99214 OFFICE O/P EST MOD 30 MIN: CPT | Performed by: STUDENT IN AN ORGANIZED HEALTH CARE EDUCATION/TRAINING PROGRAM

## 2024-04-16 PROCEDURE — 1160F RVW MEDS BY RX/DR IN RCRD: CPT | Performed by: STUDENT IN AN ORGANIZED HEALTH CARE EDUCATION/TRAINING PROGRAM

## 2024-04-16 PROCEDURE — 1159F MED LIST DOCD IN RCRD: CPT | Performed by: STUDENT IN AN ORGANIZED HEALTH CARE EDUCATION/TRAINING PROGRAM

## 2024-04-16 PROCEDURE — G0439 PPPS, SUBSEQ VISIT: HCPCS | Performed by: STUDENT IN AN ORGANIZED HEALTH CARE EDUCATION/TRAINING PROGRAM

## 2024-04-16 RX ORDER — ARIPIPRAZOLE 10 MG/1
10 TABLET ORAL DAILY
Qty: 90 TABLET | Refills: 1 | Status: SHIPPED | OUTPATIENT
Start: 2024-04-16

## 2024-04-16 NOTE — PROGRESS NOTES
The ABCs of the Annual Wellness Visit  Subsequent Medicare Wellness Visit    Subjective    Chel Hi is a 78 y.o. female who presents for a Subsequent Medicare Wellness Visit.    The following portions of the patient's history were reviewed and   updated as appropriate: allergies, current medications, past family history, past medical history, past social history, past surgical history, and problem list.    Compared to one year ago, the patient feels her physical   health is the same.    Compared to one year ago, the patient feels her mental   health is the same.    Recent Hospitalizations:  She was not admitted to the hospital during the last year.     Current Medical Providers:  Patient Care Team:  Jordan Awan,  as PCP - General (Family Medicine)    Outpatient Medications Prior to Visit   Medication Sig Dispense Refill    DULoxetine (CYMBALTA) 30 MG capsule TAKE 1 CAPSULE DAILY 90 capsule 1    gabapentin (NEURONTIN) 300 MG capsule Take 1 capsule by mouth Every Night. 30 capsule 2    hydroCHLOROthiazide (MICROZIDE) 12.5 MG capsule TAKE 1 CAPSULE DAILY 90 capsule 1    hydrOXYzine (ATARAX) 25 MG tablet Take 1 tablet by mouth 2 (Two) Times a Day As Needed for Anxiety. 90 tablet 1    omeprazole (priLOSEC) 40 MG capsule TAKE 1 CAPSULE DAILY 90 capsule 1    ARIPiprazole (ABILIFY) 5 MG tablet TAKE 1 TABLET DAILY 90 tablet 1     No facility-administered medications prior to visit.       No opioid medication identified on active medication list. I have reviewed chart for other potential  high risk medication/s and harmful drug interactions in the elderly.        Aspirin is not on active medication list.  Aspirin use is not indicated based on review of current medical condition/s. Risk of harm outweighs potential benefits.  .    Patient Active Problem List   Diagnosis    Absolute anemia    Anxiety    Arthritis    Depression    Benign essential hypertension    Fibromyalgia    Restless leg syndrome     "Esophageal reflux    Insomnia    Spinal stenosis of lumbar region    Polyarthropathy or polyarthritis     Advance Care Planning   Advance Care Planning     Advance Directive is on file.  ACP discussion was held with the patient during this visit. Patient has an advance directive in EMR which is still valid.      Objective    Vitals:    24 1533   BP: 126/77   Pulse: 78   Resp: 18   SpO2: 98%   Weight: 64.1 kg (141 lb 6.4 oz)   Height: 165.1 cm (65\")     Estimated body mass index is 23.53 kg/m² as calculated from the following:    Height as of this encounter: 165.1 cm (65\").    Weight as of this encounter: 64.1 kg (141 lb 6.4 oz).    BMI is within normal parameters. No other follow-up for BMI required.    Does the patient have evidence of cognitive impairment? No    Lab Results   Component Value Date    TRIG 129 2024    HDL 96 (H) 2024     (H) 2024    VLDL 22 2024    HGBA1C 5.50 2024        HEALTH RISK ASSESSMENT    Smoking Status:  Social History     Tobacco Use   Smoking Status Never    Passive exposure: Never   Smokeless Tobacco Never     Alcohol Consumption:  Social History     Substance and Sexual Activity   Alcohol Use No     Fall Risk Screen:    CURTADI Fall Risk Assessment was completed, and patient is at LOW risk for falls.Assessment completed on:2024    Depression Screenin/16/2024     3:39 PM   PHQ-2/PHQ-9 Depression Screening   Little Interest or Pleasure in Doing Things 3-->nearly every day   Feeling Down, Depressed or Hopeless 3-->nearly every day   Trouble Falling or Staying Asleep, or Sleeping Too Much 2-->more than half the days   Feeling Tired or Having Little Energy 2-->more than half the days   Poor Appetite or Overeating 0-->not at all   Feeling Bad about Yourself - or that You are a Failure or Have Let Yourself or Your Family Down 0-->not at all   Trouble Concentrating on Things, Such as Reading the Newspaper or Watching Television " 1-->several days   Moving or Speaking So Slowly that Other People Could Have Noticed? Or the Opposite - Being So Fidgety 0-->not at all   Thoughts that You Would be Better Off Dead or of Hurting Yourself in Some Way 0-->not at all   PHQ-9: Brief Depression Severity Measure Score 11   If You Checked Off Any Problems, How Difficult Have These Problems Made It For You to Do Your Work, Take Care of Things at Home, or Get Along with Other People? somewhat difficult       Health Habits and Functional and Cognitive Screenin/16/2024     3:40 PM   Functional & Cognitive Status   Do you have difficulty preparing food and eating? No   Do you have difficulty bathing yourself, getting dressed or grooming yourself? No   Do you have difficulty using the toilet? No   Do you have difficulty moving around from place to place? No   Do you have trouble with steps or getting out of a bed or a chair? No   Current Diet Well Balanced Diet   Dental Exam Up to date   Eye Exam Not up to date   Exercise (times per week) 0 times per week   Current Exercises Include No Regular Exercise   Do you need help using the phone?  No   Are you deaf or do you have serious difficulty hearing?  Yes   Do you need help to go to places out of walking distance? No   Do you need help shopping? No   Do you need help preparing meals?  No   Do you need help with housework?  No   Do you need help with laundry? No   Do you need help taking your medications? No   Do you need help managing money? No   Do you ever drive or ride in a car without wearing a seat belt? No   Have you felt unusual stress, anger or loneliness in the last month? No   Who do you live with? Spouse   If you need help, do you have trouble finding someone available to you? No   Have you been bothered in the last four weeks by sexual problems? No   Do you have difficulty concentrating, remembering or making decisions? No       Age-appropriate Screening Schedule:  Refer to the list below for  "future screening recommendations based on patient's age, sex and/or medical conditions. Orders for these recommended tests are listed in the plan section. The patient has been provided with a written plan.    Health Maintenance   Topic Date Due    TDAP/TD VACCINES (1 - Tdap) Never done    ZOSTER VACCINE (1 of 2) Never done    RSV Vaccine - Adults (1 - 1-dose 60+ series) Never done    DXA SCAN  09/25/2021    ANNUAL WELLNESS VISIT  01/27/2023    INFLUENZA VACCINE  08/01/2024    LIPID PANEL  04/09/2025    COVID-19 Vaccine  Completed    Pneumococcal Vaccine 65+  Completed    HEPATITIS C SCREENING  Discontinued    COLORECTAL CANCER SCREENING  Discontinued                  CMS Preventative Services Quick Reference  Risk Factors Identified During Encounter  Immunizations Discussed/Encouraged: Shingrix and RSV (Respiratory Syncytial Virus)  The above risks/problems have been discussed with the patient.  Pertinent information has been shared with the patient in the After Visit Summary.  An After Visit Summary and PPPS were made available to the patient.    Follow Up:   Next Medicare Wellness visit to be scheduled in 1 year.       Additional E&M Note during same encounter follows:  Patient has multiple medical problems which are significant and separately identifiable that require additional work above and beyond the Medicare Wellness Visit.      Chief Complaint  Medicare Wellness-subsequent    Subjective        HPI  No specific concerns.  Reviewed labs from 4/9, all looked stable.    Her anxiety is still high.  Leaving the house makes her anxious.    She is on abilify 5 mg daily and cymbalta 30 mg daily.    She feels like maybe an increase in her medications might be helpful.  She is due for DEXA scan.      Objective   Vital Signs:  /77   Pulse 78   Resp 18   Ht 165.1 cm (65\")   Wt 64.1 kg (141 lb 6.4 oz)   SpO2 98%   BMI 23.53 kg/m²     GEN: In no acute distress, overall well appearing  HEENT: Pupils equal and " reactive to light, sclera clear. Mucous membranes moist. Oropharynx without erythema or exudate. No cervical or submandibular lymphadenopathy.  TM WNL bilaterally.  CV: Regular rate and rhythm, no murmurs, 2+ peripheral pulses, No extremity edema.   RESP: Lungs clear to auscultation anteriorly and posteriorly in all lung fields bilaterally.  NEURO: AAO to person, place, and time. CN 2-12 intact grossly.  PSYCH: Affect normal, insight fair              Assessment and Plan   Diagnoses and all orders for this visit:    1. Medicare annual wellness visit, subsequent (Primary)  Overall reassuring exam.  Blood pressure goal today.  We reviewed blood work, encouraged to drink more water to improve kidney function.  Encouraged regular physical activity.  Encouraged healthy diet with plenty of fruits and vegetables.  Up-to-date on mammogram, last was in March 2024.  DEXA scan ordered today.  Wellness in 1 year, follow-up 3 months for mood.    2. Benign essential hypertension  Blood pressure at goal today, continue hydrochlorothiazide 12.5 mg daily.    3. Anxiety  4. Recurrent major depressive disorder, in remission  Continue Cymbalta 30 mg daily.  Increase Abilify from 5 mg to 10 mg daily.  Follow-up 3 months.  -     ARIPiprazole (ABILIFY) 10 MG tablet; Take 1 tablet by mouth Daily.  Dispense: 90 tablet; Refill: 1    5. Restless leg syndrome  Continue gabapentin 300 mg nightly.    6. Asymptomatic menopausal state  DEXA scan ordered for bone density screening.  -     DEXA Bone Density Axial; Future      Follow Up   Return in about 3 months (around 7/16/2024) for anxiety f/u .  Patient was given instructions and counseling regarding her condition or for health maintenance advice. Please see specific information pulled into the AVS if appropriate.

## 2024-04-23 ENCOUNTER — HOSPITAL ENCOUNTER (OUTPATIENT)
Dept: BONE DENSITY | Facility: HOSPITAL | Age: 79
Discharge: HOME | End: 2024-04-23
Admitting: STUDENT IN AN ORGANIZED HEALTH CARE EDUCATION/TRAINING PROGRAM
Payer: MEDICARE

## 2024-04-23 DIAGNOSIS — Z78.0 ASYMPTOMATIC MENOPAUSAL STATE: ICD-10-CM

## 2024-04-23 PROCEDURE — 77080 DXA BONE DENSITY AXIAL: CPT

## 2024-05-01 DIAGNOSIS — G25.81 RESTLESS LEGS SYNDROME: ICD-10-CM

## 2024-05-01 DIAGNOSIS — G62.9 PERIPHERAL POLYNEUROPATHY: ICD-10-CM

## 2024-05-01 RX ORDER — GABAPENTIN 300 MG/1
300 CAPSULE ORAL NIGHTLY
Qty: 30 CAPSULE | Refills: 2 | Status: SHIPPED | OUTPATIENT
Start: 2024-05-01

## 2024-05-01 NOTE — TELEPHONE ENCOUNTER
Caller: Chel Hi THOMAS    Relationship: Self    Best call back number: [phone]     Requested Prescriptions:   Requested Prescriptions     Pending Prescriptions Disp Refills    gabapentin (NEURONTIN) 300 MG capsule 30 capsule 2     Sig: Take 1 capsule by mouth Every Night.        Pharmacy where request should be sent: [facility]Y #[id number] - [place], [place] - [address] - [phone]  - [phone] FX     Last office visit with prescribing clinician: 4/16/2024   Last telemedicine visit with prescribing clinician: Visit date not found   Next office visit with prescribing clinician: 7/22/2024     Does the patient have less than a 3 day supply:  [] Yes  [x] No    Would you like a call back once the refill request has been completed: [] Yes [] No    If the office needs to give you a call back, can they leave a voicemail: [] Yes [] No    Aga Mosher Rep   05/01/24 10:16 EDT

## 2024-06-25 DIAGNOSIS — M79.7 FIBROMYALGIA: ICD-10-CM

## 2024-06-25 DIAGNOSIS — K21.9 GASTROESOPHAGEAL REFLUX DISEASE WITHOUT ESOPHAGITIS: ICD-10-CM

## 2024-06-25 DIAGNOSIS — I10 BENIGN ESSENTIAL HYPERTENSION: ICD-10-CM

## 2024-06-25 RX ORDER — HYDROCHLOROTHIAZIDE 12.5 MG/1
CAPSULE, GELATIN COATED ORAL
Qty: 90 CAPSULE | Refills: 1 | Status: SHIPPED | OUTPATIENT
Start: 2024-06-25

## 2024-06-25 RX ORDER — OMEPRAZOLE 40 MG/1
CAPSULE, DELAYED RELEASE ORAL
Qty: 90 CAPSULE | Refills: 1 | Status: SHIPPED | OUTPATIENT
Start: 2024-06-25

## 2024-06-25 RX ORDER — DULOXETIN HYDROCHLORIDE 30 MG/1
CAPSULE, DELAYED RELEASE ORAL
Qty: 90 CAPSULE | Refills: 1 | Status: SHIPPED | OUTPATIENT
Start: 2024-06-25

## 2024-07-22 ENCOUNTER — OFFICE VISIT (OUTPATIENT)
Dept: FAMILY MEDICINE CLINIC | Facility: CLINIC | Age: 79
End: 2024-07-22
Payer: MEDICARE

## 2024-07-22 VITALS
OXYGEN SATURATION: 97 % | HEART RATE: 94 BPM | DIASTOLIC BLOOD PRESSURE: 84 MMHG | HEIGHT: 65 IN | WEIGHT: 146.2 LBS | BODY MASS INDEX: 24.36 KG/M2 | RESPIRATION RATE: 18 BRPM | SYSTOLIC BLOOD PRESSURE: 142 MMHG

## 2024-07-22 DIAGNOSIS — G25.81 RESTLESS LEGS SYNDROME: ICD-10-CM

## 2024-07-22 DIAGNOSIS — G62.9 PERIPHERAL POLYNEUROPATHY: ICD-10-CM

## 2024-07-22 DIAGNOSIS — F33.40 RECURRENT MAJOR DEPRESSIVE DISORDER, IN REMISSION: ICD-10-CM

## 2024-07-22 DIAGNOSIS — F41.9 ANXIETY: Primary | ICD-10-CM

## 2024-07-22 PROCEDURE — 1160F RVW MEDS BY RX/DR IN RCRD: CPT | Performed by: STUDENT IN AN ORGANIZED HEALTH CARE EDUCATION/TRAINING PROGRAM

## 2024-07-22 PROCEDURE — 1126F AMNT PAIN NOTED NONE PRSNT: CPT | Performed by: STUDENT IN AN ORGANIZED HEALTH CARE EDUCATION/TRAINING PROGRAM

## 2024-07-22 PROCEDURE — 3079F DIAST BP 80-89 MM HG: CPT | Performed by: STUDENT IN AN ORGANIZED HEALTH CARE EDUCATION/TRAINING PROGRAM

## 2024-07-22 PROCEDURE — 99213 OFFICE O/P EST LOW 20 MIN: CPT | Performed by: STUDENT IN AN ORGANIZED HEALTH CARE EDUCATION/TRAINING PROGRAM

## 2024-07-22 PROCEDURE — 3077F SYST BP >= 140 MM HG: CPT | Performed by: STUDENT IN AN ORGANIZED HEALTH CARE EDUCATION/TRAINING PROGRAM

## 2024-07-22 PROCEDURE — 1159F MED LIST DOCD IN RCRD: CPT | Performed by: STUDENT IN AN ORGANIZED HEALTH CARE EDUCATION/TRAINING PROGRAM

## 2024-07-22 RX ORDER — HYDROXYZINE HYDROCHLORIDE 25 MG/1
25 TABLET, FILM COATED ORAL 2 TIMES DAILY PRN
Qty: 90 TABLET | Refills: 1 | Status: SHIPPED | OUTPATIENT
Start: 2024-07-22

## 2024-07-22 RX ORDER — GABAPENTIN 300 MG/1
300 CAPSULE ORAL NIGHTLY
Qty: 90 CAPSULE | Refills: 3 | Status: SHIPPED | OUTPATIENT
Start: 2024-07-22

## 2024-07-22 RX ORDER — GABAPENTIN 300 MG/1
300 CAPSULE ORAL NIGHTLY
Qty: 90 CAPSULE | Refills: 3 | Status: SHIPPED | OUTPATIENT
Start: 2024-07-22 | End: 2024-07-22

## 2024-07-22 RX ORDER — HYDROXYZINE HYDROCHLORIDE 25 MG/1
25 TABLET, FILM COATED ORAL 2 TIMES DAILY PRN
Qty: 90 TABLET | Refills: 1 | Status: SHIPPED | OUTPATIENT
Start: 2024-07-22 | End: 2024-07-22

## 2024-07-22 NOTE — PROGRESS NOTES
"Chief Complaint  Chief Complaint   Patient presents with    Anxiety     3 month follow  up     Subjective        Chel Hi is a 78 y.o. female who presents to [facility].    History of Present Illness  Here for f/u of anxiety.   Last seen 4/16, abilify was increased from 5 mg to 10 mg daily at that time for worsening anxiety.  She takes atarax 25 mg prn.  Here for close f/u.    She takes the hydroxyzine typically daily.  No particular time of day that is worse.  Some nights she doesn't sleep as well.    She feels she is managing well with the above and would like to not make any changes.    She does need a refill of the gabapentin which she takes for restless legs at night.      Objective   /84   Pulse 94   Resp 18   Ht 165.1 cm (65\")   Wt 66.3 kg (146 lb 3.2 oz)   SpO2 97%   BMI 24.33 kg/m²     Estimated body mass index is 24.33 kg/m² as calculated from the following:    Height as of this encounter: 165.1 cm (65\").    Weight as of this encounter: 66.3 kg (146 lb 3.2 oz).     Physical Exam   GEN: In no acute distress, non toxic appearing  NEURO: AAO to person, place, and time. CN 2-12 intact grossly.  PSYCH: Affect normal, insight fair     Result Review :              Assessment and Plan     Diagnoses and all orders for this visit:    1. Anxiety (Primary)  2. Recurrent major depressive disorder, in remission  Continue Cymbalta 30 mg daily, Abilify 10 mg daily, as needed hydroxyzine 25 mg daily.  -     hydrOXYzine (ATARAX) 25 MG tablet; Take 1 tablet by mouth 2 (Two) Times a Day As Needed for Anxiety.  Dispense: 90 tablet; Refill: 1    3. Restless legs syndrome  4. Peripheral polyneuropathy  Continue gabapentin 300 mg nightly.  -     gabapentin (NEURONTIN) 300 MG capsule; Take 1 capsule by mouth Every Night.  Dispense: 90 capsule; Refill: 3       Follow Up     Return in about 6 months (around 1/22/2025) for anxiety f/u.    "

## 2024-10-28 DIAGNOSIS — F41.9 ANXIETY: ICD-10-CM

## 2024-10-28 DIAGNOSIS — F33.40 RECURRENT MAJOR DEPRESSIVE DISORDER, IN REMISSION: ICD-10-CM

## 2024-10-28 RX ORDER — ARIPIPRAZOLE 10 MG/1
10 TABLET ORAL DAILY
Qty: 90 TABLET | Refills: 1 | Status: SHIPPED | OUTPATIENT
Start: 2024-10-28

## 2024-10-28 NOTE — TELEPHONE ENCOUNTER
Caller: Chel Hi THOMAS    Relationship: Self    Best call back number: [phone] (home)     Requested Prescriptions:   Requested Prescriptions     Pending Prescriptions Disp Refills    ARIPiprazole (ABILIFY) 10 MG tablet 90 tablet 1     Sig: Take 1 tablet by mouth Daily.      Pharmacy where request should be sent: [facility] [place] - [address] AT [address] TO [facility] [address] - [phone]  - [phone] FX     Last office visit with prescribing clinician: 7/22/2024   Last telemedicine visit with prescribing clinician: Visit date not found   Next office visit with prescribing clinician: 1/20/2025     Additional details provided by patient: PT IS OUT OF MEDICATION.     Does the patient have less than a 3 day supply:  [x] Yes  [] No    Would you like a call back once the refill request has been completed: [] Yes [x] No    Aga Zapata Rep   10/28/24 09:42 EDT

## 2024-11-15 DIAGNOSIS — F33.40 RECURRENT MAJOR DEPRESSIVE DISORDER, IN REMISSION: ICD-10-CM

## 2024-11-15 DIAGNOSIS — F41.9 ANXIETY: ICD-10-CM

## 2024-11-15 RX ORDER — HYDROXYZINE HYDROCHLORIDE 25 MG/1
25 TABLET, FILM COATED ORAL 2 TIMES DAILY PRN
Qty: 60 TABLET | Refills: 2 | Status: SHIPPED | OUTPATIENT
Start: 2024-11-15

## 2024-11-27 ENCOUNTER — TELEPHONE (OUTPATIENT)
Dept: PEDIATRICS | Facility: OTHER | Age: 79
End: 2024-11-27
Payer: MEDICARE

## 2024-11-27 DIAGNOSIS — M62.838 MUSCLE SPASM: Primary | ICD-10-CM

## 2024-11-27 NOTE — TELEPHONE ENCOUNTER
PATIENT CALLED FOR MEDICATION REFILL OF   tiZANidine (ZANAFLEX) 4 MG tablet   NOT LISTED ON CURRENT MED LIST    [facility] [place] [facility] [address] [facility] - [phone]  - [phone]  [phone]     CALL BACK NUMBER  [phone]

## 2024-12-15 DIAGNOSIS — I10 BENIGN ESSENTIAL HYPERTENSION: ICD-10-CM

## 2024-12-15 DIAGNOSIS — M79.7 FIBROMYALGIA: ICD-10-CM

## 2024-12-15 DIAGNOSIS — K21.9 GASTROESOPHAGEAL REFLUX DISEASE WITHOUT ESOPHAGITIS: ICD-10-CM

## 2024-12-16 RX ORDER — OMEPRAZOLE 40 MG/1
CAPSULE, DELAYED RELEASE ORAL
Qty: 90 CAPSULE | Refills: 1 | Status: SHIPPED | OUTPATIENT
Start: 2024-12-16

## 2024-12-16 RX ORDER — HYDROCHLOROTHIAZIDE 12.5 MG/1
CAPSULE ORAL
Qty: 90 CAPSULE | Refills: 1 | Status: SHIPPED | OUTPATIENT
Start: 2024-12-16

## 2024-12-16 RX ORDER — DULOXETIN HYDROCHLORIDE 30 MG/1
CAPSULE, DELAYED RELEASE ORAL
Qty: 90 CAPSULE | Refills: 1 | Status: SHIPPED | OUTPATIENT
Start: 2024-12-16

## 2025-02-13 DIAGNOSIS — F33.40 RECURRENT MAJOR DEPRESSIVE DISORDER, IN REMISSION: ICD-10-CM

## 2025-02-13 DIAGNOSIS — F41.9 ANXIETY: ICD-10-CM

## 2025-02-13 RX ORDER — HYDROXYZINE HYDROCHLORIDE 25 MG/1
25 TABLET, FILM COATED ORAL 2 TIMES DAILY PRN
Qty: 180 TABLET | Refills: 2 | Status: SHIPPED | OUTPATIENT
Start: 2025-02-13

## 2025-05-13 DIAGNOSIS — F33.40 RECURRENT MAJOR DEPRESSIVE DISORDER, IN REMISSION: ICD-10-CM

## 2025-05-13 DIAGNOSIS — F41.9 ANXIETY: ICD-10-CM

## 2025-05-13 RX ORDER — ARIPIPRAZOLE 10 MG/1
10 TABLET ORAL DAILY
Qty: 90 TABLET | Refills: 0 | Status: SHIPPED | OUTPATIENT
Start: 2025-05-13

## 2025-05-13 NOTE — TELEPHONE ENCOUNTER
Caller: Kolton Chel THOMAS    Relationship: Self    Best call back number: [phone]     Requested Prescriptions:   Requested Prescriptions     Pending Prescriptions Disp Refills    ARIPiprazole (ABILIFY) 10 MG tablet 90 tablet 1     Sig: Take 1 tablet by mouth Daily.        Pharmacy where request should be sent: [facility] - [place] - [address] AT [address] [facility] - [phone]  - [phone]      Last office visit with prescribing clinician: 7/22/2024   Last telemedicine visit with prescribing clinician: Visit date not found   Next office visit with prescribing clinician: Visit date not found     Additional details provided by patient:      Does the patient have less than a 3 day supply:  [] Yes  [] No    Would you like a call back once the refill request has been completed: [] Yes [] No    If the office needs to give you a call back, can they leave a voicemail: [] Yes [] No    Karson Mora   05/13/25 14:40 EDT

## 2025-05-21 ENCOUNTER — HOSPITAL ENCOUNTER (OUTPATIENT)
Dept: MAMMOGRAPHY | Facility: HOSPITAL | Age: 80
Discharge: HOME | End: 2025-05-21
Admitting: STUDENT IN AN ORGANIZED HEALTH CARE EDUCATION/TRAINING PROGRAM
Payer: MEDICARE

## 2025-05-21 DIAGNOSIS — Z12.31 ENCOUNTER FOR SCREENING MAMMOGRAM FOR MALIGNANT NEOPLASM OF BREAST: ICD-10-CM

## 2025-05-21 PROCEDURE — 77067 SCR MAMMO BI INCL CAD: CPT

## 2025-05-21 PROCEDURE — 77063 BREAST TOMOSYNTHESIS BI: CPT

## 2025-05-22 ENCOUNTER — LAB (OUTPATIENT)
Dept: LAB | Facility: HOSPITAL | Age: 80
End: 2025-05-22
Payer: MEDICARE

## 2025-05-22 ENCOUNTER — TRANSCRIBE ORDERS (OUTPATIENT)
Dept: ADMINISTRATIVE | Facility: HOSPITAL | Age: 80
End: 2025-05-22
Payer: MEDICARE

## 2025-05-22 ENCOUNTER — HOSPITAL ENCOUNTER (OUTPATIENT)
Dept: CARDIOLOGY | Facility: HOSPITAL | Age: 80
Discharge: HOME | End: 2025-05-22
Payer: MEDICARE

## 2025-05-22 ENCOUNTER — HOSPITAL ENCOUNTER (OUTPATIENT)
Dept: GENERAL RADIOLOGY | Facility: HOSPITAL | Age: 80
Discharge: HOME | End: 2025-05-22
Payer: MEDICARE

## 2025-05-22 DIAGNOSIS — Z01.818 PRE-OP EXAMINATION: ICD-10-CM

## 2025-05-22 DIAGNOSIS — I10 ESSENTIAL HYPERTENSION, MALIGNANT: ICD-10-CM

## 2025-05-22 DIAGNOSIS — I10 ESSENTIAL HYPERTENSION, MALIGNANT: Primary | ICD-10-CM

## 2025-05-22 LAB
ANION GAP SERPL CALCULATED.3IONS-SCNC: 12.5 MMOL/L (ref 5–15)
BASOPHILS # BLD AUTO: 0.02 10*3/MM3 (ref 0–0.2)
BASOPHILS NFR BLD AUTO: 0.3 % (ref 0–1.5)
BUN SERPL-MCNC: 24 MG/DL (ref 8–23)
BUN/CREAT SERPL: 19.2 (ref 7–25)
CALCIUM SPEC-SCNC: 9.6 MG/DL (ref 8.6–10.5)
CHLORIDE SERPL-SCNC: 101 MMOL/L (ref 98–107)
CO2 SERPL-SCNC: 27.5 MMOL/L (ref 22–29)
CREAT SERPL-MCNC: 1.25 MG/DL (ref 0.57–1)
DEPRECATED RDW RBC AUTO: 40.4 FL (ref 37–54)
EGFRCR SERPLBLD CKD-EPI 2021: 44.2 ML/MIN/1.73
EOSINOPHIL # BLD AUTO: 0.15 10*3/MM3 (ref 0–0.4)
EOSINOPHIL NFR BLD AUTO: 2.4 % (ref 0.3–6.2)
ERYTHROCYTE [DISTWIDTH] IN BLOOD BY AUTOMATED COUNT: 12.1 % (ref 12.3–15.4)
GLUCOSE SERPL-MCNC: 88 MG/DL (ref 65–99)
HCT VFR BLD AUTO: 39.1 % (ref 34–46.6)
HGB BLD-MCNC: 12.8 G/DL (ref 12–15.9)
IMM GRANULOCYTES # BLD AUTO: 0.02 10*3/MM3 (ref 0–0.05)
IMM GRANULOCYTES NFR BLD AUTO: 0.3 % (ref 0–0.5)
LYMPHOCYTES # BLD AUTO: 2.32 10*3/MM3 (ref 0.7–3.1)
LYMPHOCYTES NFR BLD AUTO: 37.3 % (ref 19.6–45.3)
MCH RBC QN AUTO: 29.7 PG (ref 26.6–33)
MCHC RBC AUTO-ENTMCNC: 32.7 G/DL (ref 31.5–35.7)
MCV RBC AUTO: 90.7 FL (ref 79–97)
MONOCYTES # BLD AUTO: 0.43 10*3/MM3 (ref 0.1–0.9)
MONOCYTES NFR BLD AUTO: 6.9 % (ref 5–12)
NEUTROPHILS NFR BLD AUTO: 3.28 10*3/MM3 (ref 1.7–7)
NEUTROPHILS NFR BLD AUTO: 52.8 % (ref 42.7–76)
NRBC BLD AUTO-RTO: 0 /100 WBC (ref 0–0.2)
PLATELET # BLD AUTO: 210 10*3/MM3 (ref 140–450)
PMV BLD AUTO: 9.9 FL (ref 6–12)
POTASSIUM SERPL-SCNC: 4.5 MMOL/L (ref 3.5–5.2)
QT INTERVAL: 385 MS
QTC INTERVAL: 449 MS
RBC # BLD AUTO: 4.31 10*6/MM3 (ref 3.77–5.28)
SODIUM SERPL-SCNC: 141 MMOL/L (ref 136–145)
WBC NRBC COR # BLD AUTO: 6.22 10*3/MM3 (ref 3.4–10.8)

## 2025-05-22 PROCEDURE — 36415 COLL VENOUS BLD VENIPUNCTURE: CPT

## 2025-05-22 PROCEDURE — 71046 X-RAY EXAM CHEST 2 VIEWS: CPT

## 2025-05-22 PROCEDURE — 93005 ELECTROCARDIOGRAM TRACING: CPT | Performed by: PODIATRIST

## 2025-05-22 PROCEDURE — 85025 COMPLETE CBC W/AUTO DIFF WBC: CPT

## 2025-05-22 PROCEDURE — 80048 BASIC METABOLIC PNL TOTAL CA: CPT

## 2025-06-09 DIAGNOSIS — M62.838 MUSCLE SPASM: ICD-10-CM

## 2025-06-09 NOTE — TELEPHONE ENCOUNTER
Caller: Kol[place] Chel THOMAS    Relationship: Self    Best call back number: [phone]     Requested Prescriptions:   Requested Prescriptions     Pending Prescriptions Disp Refills    tiZANidine (ZANAFLEX) 4 MG tablet 90 tablet 1     Sig: Take 1 tablet by mouth At Night As Needed for Muscle Spasms.        Pharmacy where request should be sent: [facility] - [place] - [address] AT [address] TO [facility] - [phone] [facility] [phone]      Last office visit with prescribing clinician: 7/22/2024   Last telemedicine visit with prescribing clinician: Visit date not found   Next office visit with prescribing clinician: Visit date not found     Additional details provided by patient: NO MEDICATION LEFT     Does the patient have less than a 3 day supply:  [x] Yes  [] No    Would you like a call back once the refill request has been completed: [] Yes [x] No    If the office needs to give you a call back, can they leave a voicemail: [] Yes [x] No    Aga Gomez Rep   06/09/25 09:51 EDT

## 2025-06-13 DIAGNOSIS — I10 BENIGN ESSENTIAL HYPERTENSION: ICD-10-CM

## 2025-06-13 DIAGNOSIS — K21.9 GASTROESOPHAGEAL REFLUX DISEASE WITHOUT ESOPHAGITIS: ICD-10-CM

## 2025-06-13 DIAGNOSIS — M79.7 FIBROMYALGIA: ICD-10-CM

## 2025-06-13 RX ORDER — HYDROCHLOROTHIAZIDE 12.5 MG/1
12.5 CAPSULE ORAL DAILY
Qty: 90 CAPSULE | Refills: 1 | Status: SHIPPED | OUTPATIENT
Start: 2025-06-13

## 2025-06-13 RX ORDER — DULOXETIN HYDROCHLORIDE 30 MG/1
30 CAPSULE, DELAYED RELEASE ORAL DAILY
Qty: 90 CAPSULE | Refills: 1 | Status: SHIPPED | OUTPATIENT
Start: 2025-06-13

## 2025-06-13 RX ORDER — OMEPRAZOLE 40 MG/1
40 CAPSULE, DELAYED RELEASE ORAL DAILY
Qty: 90 CAPSULE | Refills: 1 | Status: SHIPPED | OUTPATIENT
Start: 2025-06-13

## 2025-08-02 DIAGNOSIS — G25.81 RESTLESS LEGS SYNDROME: ICD-10-CM

## 2025-08-02 DIAGNOSIS — G62.9 PERIPHERAL POLYNEUROPATHY: ICD-10-CM

## 2025-08-04 RX ORDER — GABAPENTIN 300 MG/1
300 CAPSULE ORAL NIGHTLY
Qty: 90 CAPSULE | Refills: 3 | Status: SHIPPED | OUTPATIENT
Start: 2025-08-04

## 2025-08-22 DIAGNOSIS — F41.9 ANXIETY: ICD-10-CM

## 2025-08-22 DIAGNOSIS — F33.40 RECURRENT MAJOR DEPRESSIVE DISORDER, IN REMISSION: ICD-10-CM

## 2025-08-22 RX ORDER — ARIPIPRAZOLE 10 MG/1
10 TABLET ORAL DAILY
Qty: 90 TABLET | Refills: 0 | Status: SHIPPED | OUTPATIENT
Start: 2025-08-22